# Patient Record
Sex: FEMALE | Race: WHITE | NOT HISPANIC OR LATINO | Employment: PART TIME | RURAL
[De-identification: names, ages, dates, MRNs, and addresses within clinical notes are randomized per-mention and may not be internally consistent; named-entity substitution may affect disease eponyms.]

---

## 2017-05-23 ENCOUNTER — OFFICE VISIT (OUTPATIENT)
Dept: RETAIL CLINIC | Facility: CLINIC | Age: 52
End: 2017-05-23

## 2017-05-23 VITALS
WEIGHT: 184.6 LBS | BODY MASS INDEX: 29.67 KG/M2 | HEART RATE: 104 BPM | HEIGHT: 66 IN | OXYGEN SATURATION: 98 % | TEMPERATURE: 97.9 F | RESPIRATION RATE: 16 BRPM

## 2017-05-23 DIAGNOSIS — J01.00 ACUTE NON-RECURRENT MAXILLARY SINUSITIS: Primary | ICD-10-CM

## 2017-05-23 PROCEDURE — 99213 OFFICE O/P EST LOW 20 MIN: CPT | Performed by: NURSE PRACTITIONER

## 2017-05-23 RX ORDER — AZITHROMYCIN 250 MG/1
TABLET, FILM COATED ORAL
Qty: 6 TABLET | Refills: 0 | Status: SHIPPED | OUTPATIENT
Start: 2017-05-23 | End: 2017-10-01

## 2017-10-01 ENCOUNTER — OFFICE VISIT (OUTPATIENT)
Dept: RETAIL CLINIC | Facility: CLINIC | Age: 52
End: 2017-10-01

## 2017-10-01 VITALS
BODY MASS INDEX: 27.68 KG/M2 | WEIGHT: 172.2 LBS | RESPIRATION RATE: 16 BRPM | HEIGHT: 66 IN | TEMPERATURE: 97.4 F | OXYGEN SATURATION: 95 % | HEART RATE: 71 BPM

## 2017-10-01 DIAGNOSIS — J40 BRONCHITIS: Primary | ICD-10-CM

## 2017-10-01 DIAGNOSIS — J44.1 COPD EXACERBATION (HCC): ICD-10-CM

## 2017-10-01 PROCEDURE — 99213 OFFICE O/P EST LOW 20 MIN: CPT | Performed by: NURSE PRACTITIONER

## 2017-10-01 RX ORDER — IPRATROPIUM BROMIDE AND ALBUTEROL SULFATE 2.5; .5 MG/3ML; MG/3ML
3 SOLUTION RESPIRATORY (INHALATION) ONCE
Status: COMPLETED | OUTPATIENT
Start: 2017-10-01 | End: 2017-10-01

## 2017-10-01 RX ORDER — METHYLPREDNISOLONE ACETATE 80 MG/ML
80 INJECTION, SUSPENSION INTRA-ARTICULAR; INTRALESIONAL; INTRAMUSCULAR; SOFT TISSUE ONCE
Status: COMPLETED | OUTPATIENT
Start: 2017-10-01 | End: 2017-10-01

## 2017-10-01 RX ORDER — AZITHROMYCIN 250 MG/1
TABLET, FILM COATED ORAL
Qty: 6 TABLET | Refills: 0 | Status: SHIPPED | OUTPATIENT
Start: 2017-10-01 | End: 2019-04-14

## 2017-10-01 RX ORDER — BENZONATATE 100 MG/1
100 CAPSULE ORAL 3 TIMES DAILY PRN
Qty: 9 CAPSULE | Refills: 0 | Status: SHIPPED | OUTPATIENT
Start: 2017-10-01 | End: 2017-10-04

## 2017-10-01 RX ORDER — IPRATROPIUM BROMIDE AND ALBUTEROL SULFATE 2.5; .5 MG/3ML; MG/3ML
3 SOLUTION RESPIRATORY (INHALATION)
Status: SHIPPED | OUTPATIENT
Start: 2017-10-01

## 2017-10-01 RX ADMIN — METHYLPREDNISOLONE ACETATE 80 MG: 80 INJECTION, SUSPENSION INTRA-ARTICULAR; INTRALESIONAL; INTRAMUSCULAR; SOFT TISSUE at 16:21

## 2017-10-01 RX ADMIN — IPRATROPIUM BROMIDE AND ALBUTEROL SULFATE 3 ML: 2.5; .5 SOLUTION RESPIRATORY (INHALATION) at 15:55

## 2017-10-01 NOTE — PATIENT INSTRUCTIONS
Chronic Bronchitis  Chronic bronchitis is a lasting inflammation of the bronchial tubes, which are the tubes that carry air into your lungs. This is inflammation that occurs:   · On most days of the week.    · For at least three months at a time.    · Over a period of two years in a row.  When the bronchial tubes are inflamed, they start to produce mucus. The inflammation and buildup of mucus make it more difficult to breathe. Chronic bronchitis is usually a permanent problem and is one type of chronic obstructive pulmonary disease (COPD). People with chronic bronchitis are at greater risk for getting repeated colds, or respiratory infections.  CAUSES   Chronic bronchitis most often occurs in people who have:  · Long-standing, severe asthma.  · A history of smoking.  · Asthma and who also smoke.  SIGNS AND SYMPTOMS   Chronic bronchitis may cause the following:   · A cough that brings up mucus (productive cough).  · Shortness of breath.  · Early morning headache.  · Wheezing.  · Chest discomfort.    · Recurring respiratory infections.  DIAGNOSIS   Your health care provider may confirm the diagnosis by:  · Taking your medical history.  · Performing a physical exam.  · Taking a chest X-ray.    · Performing pulmonary function tests.  TREATMENT   Treatment involves controlling symptoms with medicines, oxygen therapy, or making lifestyle changes, such as exercising and eating a healthy, well-balanced diet. Medicines could include:  · Inhalers to improve air flow in and out of your lungs.  · Antibiotics to treat bacterial infections, such as pneumonia, sinus infections, and acute bronchitis.  As a preventative measure, your health care provider may recommend routine vaccinations for influenza and pneumonia. This is to prevent infection and hospitalization since you may be more at risk for these types of infections.    HOME CARE INSTRUCTIONS  · Take medicines only as directed by your health care provider.    · If you smoke  "cigarettes, chew tobacco, or use electronic cigarettes, quit. If you need help quitting, ask your health care provider.  · Avoid pollen, dust, animal dander, molds, smoke, and other things that cause shortness of breath or wheezing attacks.  · Talk to your health care provider about possible exercise routines. Regular exercise is very important to help you feel better.  · If you are prescribed oxygen use at home follow these guidelines:    Never smoke while using oxygen. Oxygen does not burn or explode, but flammable materials will burn faster in the presence of oxygen.    Keep a fire extinguisher close by. Let your fire department know that you have oxygen in your home.    Warn visitors not to smoke near you when you are using oxygen. Put up \"no smoking\" signs in your home where you most often use the oxygen.    Regularly test your smoke detectors at home to make sure they work. If you receive care in your home from a nurse or other health care provider, he or she may also check to make sure your smoke detectors work.  · Ask your health care provider whether you would benefit from a pulmonary rehabilitation program.  · Do not wait to get medical care if you have any concerning symptoms. Delays could cause permanent injury and may be life threatening.  SEEK MEDICAL CARE IF:  · You have increased coughing or shortness of breath or both.  · You have muscle aches.  · You have chest pain.  · Your mucus gets thicker.  · Your mucus changes from clear or white to yellow, green, gray, or bloody.  SEEK IMMEDIATE MEDICAL CARE IF:  · Your usual medicines do not stop your wheezing.    · You have increased difficulty breathing.    · You have any problems with the medicine you are taking, such as a rash, itching, swelling, or trouble breathing.  MAKE SURE YOU:   · Understand these instructions.  · Will watch your condition.  · Will get help right away if you are not doing well or get worse.     This information is not intended to " replace advice given to you by your health care provider. Make sure you discuss any questions you have with your health care provider.     Document Released: 10/05/2007 Document Revised: 01/08/2016 Document Reviewed: 01/26/2015  [employer] Interactive Patient Education ©2017 [employer]

## 2017-10-01 NOTE — PROGRESS NOTES
"Joe Batres is a 52 y.o. female.   Pulse 71  Temp 97.4 °F (36.3 °C) (Temporal Artery )   Resp 16  Ht 66\" (167.6 cm)  Wt 172 lb 3.2 oz (78.1 kg)  LMP  (LMP Unknown)  SpO2 95%  BMI 27.79 kg/m2   Uses O2 at night; COPD;   Does not monitor O2 at home so does not now how high she typically runs.     O2 went to 96% with less wheeze post neb treatment.   URI    Associated symptoms include congestion, coughing, ear pain, headaches, rhinorrhea and a sore throat. Pertinent negatives include no diarrhea, nausea, vomiting or wheezing.   Cough   Associated symptoms include ear pain, a fever (subjective), headaches, postnasal drip, rhinorrhea, a sore throat and shortness of breath. Pertinent negatives include no wheezing.   Sore Throat    Associated symptoms include congestion, coughing, ear pain, headaches and shortness of breath. Pertinent negatives include no diarrhea or vomiting.   Headache    Associated symptoms include coughing, ear pain, a fever (subjective), rhinorrhea, sinus pressure and a sore throat. Pertinent negatives include no nausea or vomiting.   COPD   This is a recurrent problem. The current episode started in the past 7 days. The problem occurs constantly. The problem has been gradually worsening. Associated symptoms include congestion, coughing, fatigue, a fever (subjective), headaches and a sore throat. Pertinent negatives include no nausea or vomiting.      Pt presents to clinic with COPD exacerbation starting a few days ago and getting worse yesterday. Last night she was having trouble breathing and had to sit in a certain position to keep her breath.   She currently is not on daily medication for this and take albuterol prn. (does not need refill). She had been getting URI around q 3 month, with multiple bouts of pneumonia, last may ending up the hospital. Pt thinks she had pneumonia shot 3 years ago, but unsure.      The following portions of the patient's history were reviewed " and updated as appropriate: allergies, current medications, past family history, past medical history, past social history, past surgical history and problem list.    Review of Systems   Constitutional: Positive for fatigue and fever (subjective).   HENT: Positive for congestion, ear pain, postnasal drip, rhinorrhea, sinus pressure and sore throat.    Respiratory: Positive for cough and shortness of breath. Negative for wheezing.    Cardiovascular: Negative for leg swelling.   Gastrointestinal: Negative for diarrhea, nausea and vomiting.   Neurological: Positive for headaches.       Objective   Physical Exam   Constitutional: She appears well-developed and well-nourished.  Non-toxic appearance. She has a sickly appearance.   HENT:   Head: Normocephalic and atraumatic.   Right Ear: Tympanic membrane and ear canal normal.   Left Ear: Tympanic membrane and ear canal normal.   Nose: Mucosal edema and rhinorrhea present.   Cardiovascular: Regular rhythm and normal heart sounds.    Pulmonary/Chest: Effort normal. She has no wheezes. She has no rhonchi. She has no rales.   Lymphadenopathy:     She has no cervical adenopathy.   Skin: Skin is warm and dry.       Assessment/Plan   Nakita was seen today for uri, cough, sore throat and headache.    Diagnoses and all orders for this visit:    Bronchitis  -     ipratropium-albuterol (DUO-NEB) nebulizer solution 3 mL; Take 3 mL by nebulization 4 (Four) Times a Day.  -     ipratropium-albuterol (DUO-NEB) nebulizer solution 3 mL; Take 3 mL by nebulization 1 (One) Time.    COPD exacerbation  -     ipratropium-albuterol (DUO-NEB) nebulizer solution 3 mL; Take 3 mL by nebulization 1 (One) Time.    Other orders  -     azithromycin (ZITHROMAX Z-KARTHIK) 250 MG tablet; Take 2 tablets the first day, then 1 tablet daily for 4 days.  -     benzonatate (TESSALON) 100 MG capsule; Take 1 capsule by mouth 3 (Three) Times a Day As Needed for Cough for up to 3 days.        Discuss pneumonia vaccine  with PCP and get if has not already.     Continued albuterol every 4 hours 2 puff for the next 3 days; then switch to as needed.

## 2019-01-15 ENCOUNTER — HOSPITAL ENCOUNTER (OUTPATIENT)
Dept: HOSPITAL 22 - PT.CARL | Age: 54
LOS: 22 days | Discharge: HOME | End: 2019-02-06
Payer: MEDICAID

## 2019-01-15 DIAGNOSIS — M25.551: ICD-10-CM

## 2019-01-15 DIAGNOSIS — M25.552: Primary | ICD-10-CM

## 2019-01-15 PROCEDURE — 97014 ELECTRIC STIMULATION THERAPY: CPT

## 2019-01-15 PROCEDURE — 97163 PT EVAL HIGH COMPLEX 45 MIN: CPT

## 2019-01-15 PROCEDURE — 97110 THERAPEUTIC EXERCISES: CPT

## 2019-01-15 PROCEDURE — G0283 ELEC STIM OTHER THAN WOUND: HCPCS

## 2019-04-14 ENCOUNTER — OFFICE VISIT (OUTPATIENT)
Dept: RETAIL CLINIC | Facility: CLINIC | Age: 54
End: 2019-04-14

## 2019-04-14 VITALS
HEIGHT: 66 IN | OXYGEN SATURATION: 96 % | DIASTOLIC BLOOD PRESSURE: 88 MMHG | TEMPERATURE: 98.8 F | WEIGHT: 146.4 LBS | SYSTOLIC BLOOD PRESSURE: 132 MMHG | HEART RATE: 76 BPM | RESPIRATION RATE: 16 BRPM | BODY MASS INDEX: 23.53 KG/M2

## 2019-04-14 DIAGNOSIS — F17.200 SMOKER: ICD-10-CM

## 2019-04-14 DIAGNOSIS — R68.89 FLU-LIKE SYMPTOMS: Primary | ICD-10-CM

## 2019-04-14 DIAGNOSIS — J06.9 UPPER RESPIRATORY TRACT INFECTION, UNSPECIFIED TYPE: ICD-10-CM

## 2019-04-14 LAB
EXPIRATION DATE: NORMAL
FLUAV AG NPH QL: NEGATIVE
FLUBV AG NPH QL: NEGATIVE
INTERNAL CONTROL: NORMAL
Lab: NORMAL

## 2019-04-14 PROCEDURE — 99213 OFFICE O/P EST LOW 20 MIN: CPT | Performed by: NURSE PRACTITIONER

## 2019-04-14 PROCEDURE — 87804 INFLUENZA ASSAY W/OPTIC: CPT | Performed by: NURSE PRACTITIONER

## 2019-04-14 RX ORDER — AZITHROMYCIN 250 MG/1
TABLET, FILM COATED ORAL
Qty: 6 TABLET | Refills: 0 | Status: SHIPPED | OUTPATIENT
Start: 2019-04-14 | End: 2019-09-28

## 2019-04-14 NOTE — PROGRESS NOTES
"Joe Batres is a 53 y.o. female.   /88   Pulse 76   Temp 98.8 °F (37.1 °C)   Resp 16   Ht 167.6 cm (66\")   Wt 66.4 kg (146 lb 6.4 oz)   LMP  (LMP Unknown)   SpO2 96%   BMI 23.63 kg/m²   Past Medical History:   Diagnosis Date   • Acid reflux    • Allergic    • Anxiety    • Arthritis    • COPD (chronic obstructive pulmonary disease) (CMS/[facility])     oxygen at night   • Elevated cholesterol    • Oxygen dependent     at night     No Known Allergies      URI    This is a new problem. The current episode started in the past 7 days. The problem has been gradually worsening. The maximum temperature recorded prior to her arrival was 100.4 - 100.9 F. Associated symptoms include congestion, coughing, a plugged ear sensation and rhinorrhea. Pertinent negatives include no chest pain, diarrhea, dysuria, ear pain, headaches, joint pain, joint swelling, nausea, neck pain, rash, sinus pain, sneezing, sore throat, swollen glands, vomiting or wheezing.        The following portions of the patient's history were reviewed and updated as appropriate: allergies, current medications, past family history, past medical history, past social history, past surgical history and problem list.    Review of Systems   HENT: Positive for congestion and rhinorrhea. Negative for ear pain, sinus pain, sneezing and sore throat.    Respiratory: Positive for cough. Negative for wheezing.    Cardiovascular: Negative for chest pain.   Gastrointestinal: Negative for diarrhea, nausea and vomiting.   Genitourinary: Negative for dysuria.   Musculoskeletal: Negative for joint pain and neck pain.   Skin: Negative for rash.   Neurological: Negative for headaches.       Objective   Physical Exam   Constitutional: She appears well-developed and well-nourished.  Non-toxic appearance. She appears ill.   HENT:   Head: Normocephalic and atraumatic.   Right Ear: Tympanic membrane and ear canal normal.   Left Ear: Tympanic membrane and ear canal " normal.   Nose: Mucosal edema and rhinorrhea present. Right sinus exhibits no maxillary sinus tenderness and no frontal sinus tenderness. Left sinus exhibits no maxillary sinus tenderness and no frontal sinus tenderness.   Mouth/Throat: Uvula is midline and mucous membranes are normal.   Cardiovascular: Regular rhythm and normal heart sounds.   Pulmonary/Chest: Effort normal. She has no wheezes. She has rhonchi. She has no rales.   Lymphadenopathy:     She has no cervical adenopathy.   Skin: Skin is warm and dry.       Assessment/Plan   Nakita was seen today for headache, cough and uri.    Diagnoses and all orders for this visit:    Flu-like symptoms  -     POC Influenza A / B    Upper respiratory tract infection, unspecified type    Smoker    Other orders  -     azithromycin (ZITHROMAX Z-KARTHIK) 250 MG tablet; Take 2 tablets the first day, then 1 tablet daily for 4 days.        Results for orders placed or performed in visit on 04/14/19   POC Influenza A / B   Result Value Ref Range    Rapid Influenza A Ag Negative Negative    Rapid Influenza B Ag Negative Negative    Internal Control Passed Passed    Lot Number [id number]     Expiration Date 4,240,698

## 2019-04-14 NOTE — PATIENT INSTRUCTIONS

## 2019-09-28 ENCOUNTER — OFFICE VISIT (OUTPATIENT)
Dept: RETAIL CLINIC | Facility: CLINIC | Age: 54
End: 2019-09-28

## 2019-09-28 VITALS — DIASTOLIC BLOOD PRESSURE: 78 MMHG | SYSTOLIC BLOOD PRESSURE: 130 MMHG

## 2019-09-28 DIAGNOSIS — H65.03 BILATERAL ACUTE SEROUS OTITIS MEDIA, RECURRENCE NOT SPECIFIED: ICD-10-CM

## 2019-09-28 DIAGNOSIS — F17.200 SMOKER: ICD-10-CM

## 2019-09-28 DIAGNOSIS — J06.9 UPPER RESPIRATORY TRACT INFECTION, UNSPECIFIED TYPE: Primary | ICD-10-CM

## 2019-09-28 PROCEDURE — 99213 OFFICE O/P EST LOW 20 MIN: CPT | Performed by: NURSE PRACTITIONER

## 2019-09-28 RX ORDER — PREDNISONE 10 MG/1
TABLET ORAL
Qty: 21 TABLET | Refills: 0 | Status: SHIPPED | OUTPATIENT
Start: 2019-09-28 | End: 2020-01-26

## 2019-09-28 RX ORDER — DEXTROMETHORPHAN HYDROBROMIDE AND PROMETHAZINE HYDROCHLORIDE 15; 6.25 MG/5ML; MG/5ML
5 SYRUP ORAL NIGHTLY PRN
Qty: 120 ML | Refills: 0 | Status: SHIPPED | OUTPATIENT
Start: 2019-09-28 | End: 2019-10-03

## 2019-09-28 RX ORDER — AZITHROMYCIN 250 MG/1
TABLET, FILM COATED ORAL
Qty: 6 TABLET | Refills: 0 | Status: SHIPPED | OUTPATIENT
Start: 2019-09-28 | End: 2020-01-26

## 2019-09-28 RX ORDER — ALBUTEROL SULFATE 2.5 MG/3ML
2.5 SOLUTION RESPIRATORY (INHALATION) EVERY 4 HOURS PRN
COMMUNITY

## 2019-09-28 NOTE — PROGRESS NOTES
Subjective   Nakita Batres is a 54 y.o. female.   /78   LMP  (LMP Unknown)   No Known Allergies  Past Medical History:   Diagnosis Date   • Acid reflux    • Allergic    • Anxiety    • Arthritis    • COPD (chronic obstructive pulmonary disease) (CMS/[facility])     oxygen at night   • Elevated cholesterol    • Oxygen dependent     at night   • Smoker          URI    This is a new problem. The current episode started in the past 7 days. The problem has been gradually worsening. Maximum temperature: subjective, low grade. Associated symptoms include congestion, coughing, rhinorrhea and sinus pain. She has tried antihistamine and acetaminophen (musinex) for the symptoms. The treatment provided no relief.        The following portions of the patient's history were reviewed and updated as appropriate: allergies, current medications, past family history, past medical history, past social history, past surgical history and problem list.    Review of Systems   Constitutional: Positive for activity change, chills, fatigue and fever. Negative for appetite change.   HENT: Positive for congestion, postnasal drip, rhinorrhea and sinus pain.    Eyes: Negative.    Respiratory: Positive for cough.    Cardiovascular: Negative.    Gastrointestinal: Negative.        Objective   Physical Exam   Constitutional: She appears well-developed and well-nourished.  Non-toxic appearance. She appears ill (mild).   HENT:   Head: Normocephalic and atraumatic.   Right Ear: Tympanic membrane and ear canal normal.   Left Ear: Tympanic membrane and ear canal normal.   Nose: Mucosal edema and rhinorrhea present.   Mouth/Throat: Uvula is midline, oropharynx is clear and moist and mucous membranes are normal.   Cardiovascular: Regular rhythm and normal heart sounds.   Pulmonary/Chest: Effort normal. She has no wheezes. She has rhonchi. She has no rales.   Lymphadenopathy:     She has no cervical adenopathy.   Skin: Skin is warm and dry.        Assessment/Plan   Diagnoses and all orders for this visit:    Upper respiratory tract infection, unspecified type    Bilateral acute serous otitis media, recurrence not specified    Smoker    Other orders  -     azithromycin (ZITHROMAX Z-KARTHIK) 250 MG tablet; Take 2 tablets the first day, then 1 tablet daily for 4 days.  -     predniSONE (DELTASONE) 10 MG tablet; 6/5/4/3/2/1 As directed PO  -     promethazine-dextromethorphan (PROMETHAZINE-DM) 6.25-15 MG/5ML syrup; Take 5 mL by mouth At Night As Needed for Cough for up to 5 days.

## 2019-09-28 NOTE — PATIENT INSTRUCTIONS

## 2020-01-26 ENCOUNTER — OFFICE VISIT (OUTPATIENT)
Dept: RETAIL CLINIC | Facility: CLINIC | Age: 55
End: 2020-01-26

## 2020-01-26 VITALS
BODY MASS INDEX: 26.03 KG/M2 | WEIGHT: 162 LBS | SYSTOLIC BLOOD PRESSURE: 116 MMHG | HEART RATE: 87 BPM | OXYGEN SATURATION: 97 % | TEMPERATURE: 98.2 F | DIASTOLIC BLOOD PRESSURE: 72 MMHG | RESPIRATION RATE: 20 BRPM | HEIGHT: 66 IN

## 2020-01-26 DIAGNOSIS — R05.9 COUGH: ICD-10-CM

## 2020-01-26 DIAGNOSIS — J01.41 ACUTE RECURRENT PANSINUSITIS: Primary | ICD-10-CM

## 2020-01-26 PROCEDURE — 99213 OFFICE O/P EST LOW 20 MIN: CPT | Performed by: NURSE PRACTITIONER

## 2020-01-26 RX ORDER — CITALOPRAM 20 MG/1
20 TABLET ORAL DAILY
COMMUNITY
Start: 2019-12-31

## 2020-01-26 RX ORDER — DOXYCYCLINE 100 MG/1
100 CAPSULE ORAL 2 TIMES DAILY
Qty: 20 CAPSULE | Refills: 0 | Status: SHIPPED | OUTPATIENT
Start: 2020-01-26 | End: 2020-02-05

## 2020-01-26 RX ORDER — BUSPIRONE HYDROCHLORIDE 5 MG/1
TABLET ORAL
COMMUNITY
Start: 2020-01-02

## 2020-01-26 RX ORDER — PSEUDOEPHEDRINE HCL 120 MG/1
120 TABLET, FILM COATED, EXTENDED RELEASE ORAL EVERY 12 HOURS
Qty: 20 TABLET | Refills: 0 | Status: SHIPPED | OUTPATIENT
Start: 2020-01-26 | End: 2020-02-05

## 2020-01-26 RX ORDER — DEXTROMETHORPHAN HYDROBROMIDE AND PROMETHAZINE HYDROCHLORIDE 15; 6.25 MG/5ML; MG/5ML
5 SYRUP ORAL 4 TIMES DAILY PRN
Qty: 240 ML | Refills: 0 | Status: SHIPPED | OUTPATIENT
Start: 2020-01-26 | End: 2020-02-05

## 2020-01-26 RX ORDER — PREDNISONE 10 MG/1
TABLET ORAL DAILY
Qty: 21 EACH | Refills: 0 | Status: SHIPPED | OUTPATIENT
Start: 2020-01-26 | End: 2020-02-01

## 2020-01-27 NOTE — PROGRESS NOTES
Subjective   Nakita Batres is a 54 y.o. female.     Sinus Problem   This is a recurrent problem. Episode onset: 2-3 weeks. The problem has been gradually worsening since onset. There has been no fever. The pain is severe. Associated symptoms include congestion, coughing, headaches, a hoarse voice, sinus pressure and swollen glands. Pertinent negatives include no chills, ear pain, neck pain, shortness of breath, sneezing or sore throat. Treatments tried: allergy medications. The treatment provided no relief.   Cough   This is a new problem. The current episode started in the past 7 days. The problem has been unchanged. The problem occurs every few minutes. The cough is non-productive. Associated symptoms include ear congestion, headaches, nasal congestion, postnasal drip and rhinorrhea (thick yellowish). Pertinent negatives include no chest pain, chills, ear pain, fever, myalgias, rash, sore throat, shortness of breath, sweats, weight loss or wheezing. The symptoms are aggravated by cold air. She has tried OTC cough suppressant for the symptoms. Her past medical history is significant for bronchitis. There is no history of asthma or pneumonia.        The following portions of the patient's history were reviewed and updated as appropriate: allergies, current medications, past medical history, past social history, past surgical history and problem list.    Review of Systems   Constitutional: Negative for appetite change, chills, fever and weight loss.   HENT: Positive for congestion, hoarse voice, postnasal drip, rhinorrhea (thick yellowish), sinus pressure and sinus pain. Negative for ear pain, sneezing, sore throat and trouble swallowing.    Eyes: Negative.    Respiratory: Positive for cough and chest tightness. Negative for shortness of breath and wheezing.    Cardiovascular: Negative.  Negative for chest pain.   Gastrointestinal: Negative for abdominal pain, diarrhea, nausea and vomiting.   Musculoskeletal:  "Negative for myalgias and neck pain.   Skin: Negative.  Negative for rash.   Neurological: Positive for headaches. Negative for dizziness.   Hematological: Positive for adenopathy.        /72   Pulse 87   Temp 98.2 °F (36.8 °C)   Resp 20   Ht 167.6 cm (66\")   Wt 73.5 kg (162 lb)   LMP  (LMP Unknown)   SpO2 97%   BMI 26.15 kg/m²      Objective   Physical Exam   Constitutional: She is oriented to person, place, and time. Vital signs are normal. She appears well-developed and well-nourished. No distress.   HENT:   Head: Normocephalic.   Right Ear: External ear and ear canal normal. No drainage, swelling or tenderness. Tympanic membrane is bulging. Tympanic membrane is not erythematous.   Left Ear: External ear and ear canal normal. No drainage, swelling or tenderness. Tympanic membrane is bulging. Tympanic membrane is not erythematous.   Nose: Mucosal edema and rhinorrhea (thick yellowish) present. Right sinus exhibits maxillary sinus tenderness and frontal sinus tenderness (severe). Left sinus exhibits maxillary sinus tenderness and frontal sinus tenderness (severe).   Mouth/Throat: Uvula is midline, oropharynx is clear and moist and mucous membranes are normal. Tonsils are 0 on the right. Tonsils are 0 on the left. No tonsillar exudate.   Eyes: Pupils are equal, round, and reactive to light. Conjunctivae are normal.   Neck: Normal range of motion. Neck supple.   Cardiovascular: Normal rate, regular rhythm, S1 normal, S2 normal and normal heart sounds.   Pulmonary/Chest: Effort normal and breath sounds normal. No stridor. No respiratory distress. She has no decreased breath sounds. She has no wheezes. She has no rhonchi. She has no rales.   Abdominal: Soft. Normal appearance and bowel sounds are normal. She exhibits no distension. There is no tenderness. There is no rebound and no guarding.   Lymphadenopathy:        Head (right side): Tonsillar (mild) adenopathy present.        Head (left side): " Tonsillar (mild) adenopathy present.     She has no cervical adenopathy.   Neurological: She is alert and oriented to person, place, and time.   Skin: Skin is warm, dry and intact. No rash noted. She is not diaphoretic.   Psychiatric: She has a normal mood and affect. Her speech is normal and behavior is normal. Thought content normal.   Vitals reviewed.      Assessment/Plan   Nakita was seen today for sinus problem.    Diagnoses and all orders for this visit:    Acute recurrent pansinusitis  -     doxycycline (MONODOX) 100 MG capsule; Take 1 capsule by mouth 2 (Two) Times a Day for 10 days.  -     pseudoephedrine (SUDAFED) 120 MG 12 hr tablet; Take 1 tablet by mouth Every 12 (Twelve) Hours for 10 days.  -     predniSONE (DELTASONE) 10 MG (21) tablet pack; Take  by mouth Daily for 6 days. Use as directed on package    Cough  -     predniSONE (DELTASONE) 10 MG (21) tablet pack; Take  by mouth Daily for 6 days. Use as directed on package  -     promethazine-dextromethorphan (PROMETHAZINE-DM) 6.25-15 MG/5ML syrup; Take 5 mL by mouth 4 (Four) Times a Day As Needed for Cough for up to 10 days.

## 2020-03-22 RX ORDER — PREDNISONE 10 MG/1
TABLET ORAL
Qty: 21 TABLET | Refills: 0 | OUTPATIENT
Start: 2020-03-22

## 2021-08-13 ENCOUNTER — HOSPITAL ENCOUNTER (OUTPATIENT)
Age: 56
End: 2021-08-13
Payer: MEDICAID

## 2021-08-13 DIAGNOSIS — R53.83: Primary | ICD-10-CM

## 2021-08-13 DIAGNOSIS — Z79.899: ICD-10-CM

## 2021-08-13 DIAGNOSIS — E55.9: ICD-10-CM

## 2021-08-13 LAB
25-OH VITAMIN D, TOTAL: 17.8 NG/ML (ref 30–100)
ALBUMIN LEVEL: 3.7 G/DL (ref 3.5–5)
ALBUMIN/GLOB SERPL: 1.4 {RATIO} (ref 1.1–1.8)
ALP ISO SERPL-ACNC: 102 U/L (ref 38–126)
ALT SERPLBLD-CCNC: 24 U/L (ref 12–78)
ANION GAP SERPL CALC-SCNC: 13.7 MEQ/L (ref 5–15)
AST SERPL QL: 24 U/L (ref 14–36)
BILIRUBIN,TOTAL: 0.4 MG/DL (ref 0.2–1.3)
BUN SERPL-MCNC: 8 MG/DL (ref 7–17)
CALCIUM SPEC-MCNC: 9.3 MG/DL (ref 8.4–10.2)
CHLORIDE SPEC-SCNC: 107 MMOL/L (ref 98–107)
CHOLEST SPEC-SCNC: 180 MG/DL (ref 140–200)
CO2 SERPL-SCNC: 25 MMOL/L (ref 22–30)
CREAT BLD-SCNC: 0.5 MG/DL (ref 0.52–1.04)
ESTIMATED GLOMERULAR FILT RATE: 128 ML/MIN (ref 60–?)
GFR (AFRICAN AMERICAN): 155 ML/MIN (ref 60–?)
GLOBULIN SER CALC-MCNC: 2.7 G/DL (ref 1.3–3.2)
GLUCOSE: 99 MG/DL (ref 74–100)
HCT VFR BLD CALC: 41.2 % (ref 37–47)
HDLC SERPL-MCNC: 50 MG/DL (ref 40–60)
HGB BLD-MCNC: 13.5 G/DL (ref 12.2–16.2)
MCHC RBC-ENTMCNC: 32.8 G/DL (ref 31.8–35.4)
MCV RBC: 92.7 FL (ref 81–99)
MEAN CORPUSCULAR HEMOGLOBIN: 30.4 PG (ref 27–31.2)
PLATELET # BLD: 695 K/MM3 (ref 142–424)
POTASSIUM: 4.7 MMOL/L (ref 3.5–5.1)
PROT SERPL-MCNC: 6.4 G/DL (ref 6.3–8.2)
RBC # BLD AUTO: 4.44 M/MM3 (ref 4.2–5.4)
SODIUM SPEC-SCNC: 141 MMOL/L (ref 136–145)
T4 FREE SERPL-MCNC: 1.34 NG/DL (ref 0.78–2.19)
TRIGLYCERIDES: 169 MG/DL (ref 30–150)
TSH SERPL-ACNC: 0.72 UIU/ML (ref 0.47–4.68)
WBC # BLD AUTO: 8.5 K/MM3 (ref 4.8–10.8)

## 2021-08-13 PROCEDURE — 82306 VITAMIN D 25 HYDROXY: CPT

## 2021-08-13 PROCEDURE — 80061 LIPID PANEL: CPT

## 2021-08-13 PROCEDURE — 85025 COMPLETE CBC W/AUTO DIFF WBC: CPT

## 2021-08-13 PROCEDURE — 84439 ASSAY OF FREE THYROXINE: CPT

## 2021-08-13 PROCEDURE — 80053 COMPREHEN METABOLIC PANEL: CPT

## 2021-08-13 PROCEDURE — 84443 ASSAY THYROID STIM HORMONE: CPT

## 2021-09-05 ENCOUNTER — HOSPITAL ENCOUNTER (EMERGENCY)
Age: 56
Discharge: HOME | End: 2021-09-05
Payer: MEDICAID

## 2021-09-05 VITALS — BODY MASS INDEX: 27.9 KG/M2

## 2021-09-05 VITALS
TEMPERATURE: 98.6 F | SYSTOLIC BLOOD PRESSURE: 120 MMHG | HEART RATE: 90 BPM | RESPIRATION RATE: 20 BRPM | DIASTOLIC BLOOD PRESSURE: 63 MMHG | OXYGEN SATURATION: 98 %

## 2021-09-05 VITALS
HEART RATE: 78 BPM | RESPIRATION RATE: 18 BRPM | SYSTOLIC BLOOD PRESSURE: 123 MMHG | TEMPERATURE: 98 F | OXYGEN SATURATION: 98 % | DIASTOLIC BLOOD PRESSURE: 65 MMHG

## 2021-09-05 DIAGNOSIS — I25.10: ICD-10-CM

## 2021-09-05 DIAGNOSIS — E78.5: ICD-10-CM

## 2021-09-05 DIAGNOSIS — J44.0: ICD-10-CM

## 2021-09-05 DIAGNOSIS — Z79.899: ICD-10-CM

## 2021-09-05 DIAGNOSIS — J44.1: Primary | ICD-10-CM

## 2021-09-05 DIAGNOSIS — F17.210: ICD-10-CM

## 2021-09-05 LAB
ALBUMIN LEVEL: 3.6 G/DL (ref 3.5–5)
ALBUMIN/GLOB SERPL: 1.2 {RATIO} (ref 1.1–1.8)
ALP ISO SERPL-ACNC: 107 U/L (ref 38–126)
ALT SERPLBLD-CCNC: 24 U/L (ref 12–78)
ANION GAP SERPL CALC-SCNC: 8.7 MEQ/L (ref 5–15)
AST SERPL QL: 27 U/L (ref 14–36)
BILIRUBIN,TOTAL: 0.2 MG/DL (ref 0.2–1.3)
BUN SERPL-MCNC: 7 MG/DL (ref 7–17)
CALCIUM SPEC-MCNC: 9 MG/DL (ref 8.4–10.2)
CHLORIDE SPEC-SCNC: 109 MMOL/L (ref 98–107)
CO2 SERPL-SCNC: 26 MMOL/L (ref 22–30)
CREAT BLD-SCNC: 0.7 MG/DL (ref 0.52–1.04)
CREATININE CLEARANCE ESTIMATED: 112 ML/MIN (ref 50–200)
ESTIMATED GLOMERULAR FILT RATE: 87 ML/MIN (ref 60–?)
GFR (AFRICAN AMERICAN): 105 ML/MIN (ref 60–?)
GLOBULIN SER CALC-MCNC: 3 G/DL (ref 1.3–3.2)
GLUCOSE: 109 MG/DL (ref 74–100)
HCT VFR BLD CALC: 39.9 % (ref 37–47)
HGB BLD-MCNC: 12.8 G/DL (ref 12.2–16.2)
MCHC RBC-ENTMCNC: 32 G/DL (ref 31.8–35.4)
MCV RBC: 97.2 FL (ref 81–99)
MEAN CORPUSCULAR HEMOGLOBIN: 31.1 PG (ref 27–31.2)
PLATELET # BLD: 486 K/MM3 (ref 142–424)
POTASSIUM: 3.7 MMOL/L (ref 3.5–5.1)
PROT SERPL-MCNC: 6.6 G/DL (ref 6.3–8.2)
RBC # BLD AUTO: 4.1 M/MM3 (ref 4.2–5.4)
SODIUM SPEC-SCNC: 140 MMOL/L (ref 136–145)
WBC # BLD AUTO: 9.1 K/MM3 (ref 4.8–10.8)

## 2021-09-05 PROCEDURE — 85025 COMPLETE CBC W/AUTO DIFF WBC: CPT

## 2021-09-05 PROCEDURE — 99283 EMERGENCY DEPT VISIT LOW MDM: CPT

## 2021-09-05 PROCEDURE — U0003 INFECTIOUS AGENT DETECTION BY NUCLEIC ACID (DNA OR RNA); SEVERE ACUTE RESPIRATORY SYNDROME CORONAVIRUS 2 (SARS-COV-2) (CORONAVIRUS DISEASE [COVID-19]), AMPLIFIED PROBE TECHNIQUE, MAKING USE OF HIGH THROUGHPUT TECHNOLOGIES AS DESCRIBED BY CMS-2020-01-R: HCPCS

## 2021-09-05 PROCEDURE — 80053 COMPREHEN METABOLIC PANEL: CPT

## 2021-09-05 PROCEDURE — 96374 THER/PROPH/DIAG INJ IV PUSH: CPT

## 2021-09-05 PROCEDURE — 71045 X-RAY EXAM CHEST 1 VIEW: CPT

## 2021-09-16 ENCOUNTER — HOSPITAL ENCOUNTER (OUTPATIENT)
Age: 56
End: 2021-09-16
Payer: MEDICAID

## 2021-09-16 DIAGNOSIS — R06.02: Primary | ICD-10-CM

## 2021-09-16 LAB — NT PRO BRAIN NATRIURETIC PEP.: 64.3 PG/ML (ref 0–125)

## 2021-09-16 PROCEDURE — 83880 ASSAY OF NATRIURETIC PEPTIDE: CPT

## 2021-09-16 PROCEDURE — 36415 COLL VENOUS BLD VENIPUNCTURE: CPT

## 2021-10-04 ENCOUNTER — HOSPITAL ENCOUNTER (OUTPATIENT)
Age: 56
End: 2021-10-04
Payer: MEDICAID

## 2021-10-04 DIAGNOSIS — J44.9: ICD-10-CM

## 2021-10-04 DIAGNOSIS — E78.5: ICD-10-CM

## 2021-10-04 DIAGNOSIS — Z99.89: ICD-10-CM

## 2021-10-04 DIAGNOSIS — Z72.0: ICD-10-CM

## 2021-10-04 DIAGNOSIS — I65.23: ICD-10-CM

## 2021-10-04 DIAGNOSIS — G47.33: ICD-10-CM

## 2021-10-04 DIAGNOSIS — K21.9: ICD-10-CM

## 2021-10-04 DIAGNOSIS — R06.00: Primary | ICD-10-CM

## 2021-10-04 PROCEDURE — 93306 TTE W/DOPPLER COMPLETE: CPT

## 2021-10-04 PROCEDURE — 71250 CT THORAX DX C-: CPT

## 2021-10-04 PROCEDURE — 93880 EXTRACRANIAL BILAT STUDY: CPT

## 2021-10-18 ENCOUNTER — HOSPITAL ENCOUNTER (OUTPATIENT)
Age: 56
End: 2021-10-18
Payer: MEDICAID

## 2021-10-18 DIAGNOSIS — M25.552: Primary | ICD-10-CM

## 2021-10-18 DIAGNOSIS — M54.9: ICD-10-CM

## 2021-10-18 DIAGNOSIS — M25.551: ICD-10-CM

## 2021-10-18 PROCEDURE — 72110 X-RAY EXAM L-2 SPINE 4/>VWS: CPT

## 2021-10-18 PROCEDURE — 73502 X-RAY EXAM HIP UNI 2-3 VIEWS: CPT

## 2021-10-28 ENCOUNTER — HOSPITAL ENCOUNTER (OUTPATIENT)
Age: 56
End: 2021-10-28
Payer: MEDICAID

## 2021-10-28 VITALS — BODY MASS INDEX: 27.9 KG/M2

## 2021-10-28 VITALS
DIASTOLIC BLOOD PRESSURE: 80 MMHG | OXYGEN SATURATION: 92 % | RESPIRATION RATE: 18 BRPM | SYSTOLIC BLOOD PRESSURE: 118 MMHG | HEART RATE: 88 BPM

## 2021-10-28 DIAGNOSIS — M46.1: Primary | ICD-10-CM

## 2021-10-28 DIAGNOSIS — M70.60: ICD-10-CM

## 2021-10-28 DIAGNOSIS — G90.519: ICD-10-CM

## 2021-10-28 PROCEDURE — G0463 HOSPITAL OUTPT CLINIC VISIT: HCPCS

## 2021-10-28 PROCEDURE — 99202 OFFICE O/P NEW SF 15 MIN: CPT

## 2021-12-03 ENCOUNTER — HOSPITAL ENCOUNTER (OUTPATIENT)
Dept: HOSPITAL 22 - SC.PAINP | Age: 56
Discharge: HOME | End: 2021-12-03
Payer: MEDICAID

## 2021-12-03 VITALS
SYSTOLIC BLOOD PRESSURE: 116 MMHG | OXYGEN SATURATION: 95 % | HEART RATE: 91 BPM | TEMPERATURE: 98.06 F | DIASTOLIC BLOOD PRESSURE: 65 MMHG | RESPIRATION RATE: 18 BRPM

## 2021-12-03 VITALS
DIASTOLIC BLOOD PRESSURE: 76 MMHG | SYSTOLIC BLOOD PRESSURE: 132 MMHG | OXYGEN SATURATION: 96 % | HEART RATE: 87 BPM | RESPIRATION RATE: 20 BRPM

## 2021-12-03 VITALS
OXYGEN SATURATION: 95 % | HEART RATE: 94 BPM | DIASTOLIC BLOOD PRESSURE: 63 MMHG | SYSTOLIC BLOOD PRESSURE: 110 MMHG | RESPIRATION RATE: 18 BRPM

## 2021-12-03 VITALS — BODY MASS INDEX: 27.9 KG/M2

## 2021-12-03 VITALS
RESPIRATION RATE: 18 BRPM | OXYGEN SATURATION: 96 % | SYSTOLIC BLOOD PRESSURE: 115 MMHG | HEART RATE: 94 BPM | DIASTOLIC BLOOD PRESSURE: 68 MMHG

## 2021-12-03 DIAGNOSIS — E78.5: ICD-10-CM

## 2021-12-03 DIAGNOSIS — Z79.899: ICD-10-CM

## 2021-12-03 DIAGNOSIS — I25.10: ICD-10-CM

## 2021-12-03 DIAGNOSIS — J44.9: ICD-10-CM

## 2021-12-03 DIAGNOSIS — M70.62: ICD-10-CM

## 2021-12-03 DIAGNOSIS — M46.1: Primary | ICD-10-CM

## 2021-12-03 DIAGNOSIS — K75.9: ICD-10-CM

## 2021-12-03 DIAGNOSIS — Z79.82: ICD-10-CM

## 2021-12-03 DIAGNOSIS — F32.A: ICD-10-CM

## 2021-12-03 DIAGNOSIS — Z72.0: ICD-10-CM

## 2021-12-03 DIAGNOSIS — F41.9: ICD-10-CM

## 2021-12-03 PROCEDURE — 27096 INJECT SACROILIAC JOINT: CPT

## 2021-12-03 PROCEDURE — 77002 NEEDLE LOCALIZATION BY XRAY: CPT

## 2021-12-03 PROCEDURE — 20610 DRAIN/INJ JOINT/BURSA W/O US: CPT

## 2021-12-03 PROCEDURE — G0260 INJ FOR SACROILIAC JT ANESTH: HCPCS

## 2021-12-21 ENCOUNTER — HOSPITAL ENCOUNTER (OUTPATIENT)
Age: 56
End: 2021-12-21
Payer: MEDICAID

## 2021-12-21 VITALS
HEART RATE: 95 BPM | RESPIRATION RATE: 18 BRPM | DIASTOLIC BLOOD PRESSURE: 87 MMHG | OXYGEN SATURATION: 95 % | SYSTOLIC BLOOD PRESSURE: 155 MMHG

## 2021-12-21 VITALS — BODY MASS INDEX: 27.9 KG/M2

## 2021-12-21 DIAGNOSIS — M46.1: Primary | ICD-10-CM

## 2021-12-21 DIAGNOSIS — M25.512: ICD-10-CM

## 2021-12-21 PROCEDURE — G0463 HOSPITAL OUTPT CLINIC VISIT: HCPCS

## 2021-12-21 PROCEDURE — 99212 OFFICE O/P EST SF 10 MIN: CPT

## 2021-12-28 ENCOUNTER — HOSPITAL ENCOUNTER (OUTPATIENT)
Age: 56
End: 2021-12-28
Payer: MEDICAID

## 2021-12-28 DIAGNOSIS — M25.512: Primary | ICD-10-CM

## 2021-12-28 PROCEDURE — 73221 MRI JOINT UPR EXTREM W/O DYE: CPT

## 2022-01-11 ENCOUNTER — HOSPITAL ENCOUNTER (OUTPATIENT)
Age: 57
End: 2022-01-11
Payer: MEDICAID

## 2022-01-11 VITALS
DIASTOLIC BLOOD PRESSURE: 77 MMHG | RESPIRATION RATE: 18 BRPM | OXYGEN SATURATION: 94 % | HEART RATE: 87 BPM | SYSTOLIC BLOOD PRESSURE: 158 MMHG

## 2022-01-11 VITALS — BODY MASS INDEX: 28.5 KG/M2

## 2022-01-11 DIAGNOSIS — M46.1: Primary | ICD-10-CM

## 2022-01-11 DIAGNOSIS — M25.512: ICD-10-CM

## 2022-01-11 DIAGNOSIS — M70.62: ICD-10-CM

## 2022-01-11 PROCEDURE — G0463 HOSPITAL OUTPT CLINIC VISIT: HCPCS

## 2022-01-11 PROCEDURE — 99212 OFFICE O/P EST SF 10 MIN: CPT

## 2022-01-28 ENCOUNTER — HOSPITAL ENCOUNTER (OUTPATIENT)
Dept: HOSPITAL 22 - SC.PAINP | Age: 57
Discharge: HOME | End: 2022-01-28
Payer: MEDICAID

## 2022-01-28 VITALS
DIASTOLIC BLOOD PRESSURE: 81 MMHG | OXYGEN SATURATION: 96 % | RESPIRATION RATE: 20 BRPM | SYSTOLIC BLOOD PRESSURE: 119 MMHG | HEART RATE: 73 BPM

## 2022-01-28 VITALS
OXYGEN SATURATION: 96 % | DIASTOLIC BLOOD PRESSURE: 85 MMHG | TEMPERATURE: 98.3 F | SYSTOLIC BLOOD PRESSURE: 111 MMHG | RESPIRATION RATE: 18 BRPM | HEART RATE: 74 BPM

## 2022-01-28 VITALS — BODY MASS INDEX: 28.7 KG/M2

## 2022-01-28 VITALS — RESPIRATION RATE: 18 BRPM | OXYGEN SATURATION: 96 % | HEART RATE: 72 BPM

## 2022-01-28 VITALS
RESPIRATION RATE: 18 BRPM | OXYGEN SATURATION: 97 % | SYSTOLIC BLOOD PRESSURE: 109 MMHG | DIASTOLIC BLOOD PRESSURE: 67 MMHG | HEART RATE: 72 BPM

## 2022-01-28 DIAGNOSIS — F32.A: ICD-10-CM

## 2022-01-28 DIAGNOSIS — E78.5: ICD-10-CM

## 2022-01-28 DIAGNOSIS — K21.9: ICD-10-CM

## 2022-01-28 DIAGNOSIS — M70.61: ICD-10-CM

## 2022-01-28 DIAGNOSIS — I25.10: ICD-10-CM

## 2022-01-28 DIAGNOSIS — M46.1: Primary | ICD-10-CM

## 2022-01-28 DIAGNOSIS — J44.9: ICD-10-CM

## 2022-01-28 DIAGNOSIS — Z72.0: ICD-10-CM

## 2022-01-28 PROCEDURE — 77002 NEEDLE LOCALIZATION BY XRAY: CPT

## 2022-01-28 PROCEDURE — 20610 DRAIN/INJ JOINT/BURSA W/O US: CPT

## 2022-01-28 PROCEDURE — 27096 INJECT SACROILIAC JOINT: CPT

## 2022-01-28 PROCEDURE — G0260 INJ FOR SACROILIAC JT ANESTH: HCPCS

## 2022-03-17 ENCOUNTER — HOSPITAL ENCOUNTER (OUTPATIENT)
Age: 57
End: 2022-03-17
Payer: MEDICAID

## 2022-03-17 VITALS — BODY MASS INDEX: 28.7 KG/M2

## 2022-03-17 VITALS
HEART RATE: 79 BPM | DIASTOLIC BLOOD PRESSURE: 72 MMHG | RESPIRATION RATE: 20 BRPM | TEMPERATURE: 98.06 F | SYSTOLIC BLOOD PRESSURE: 138 MMHG | OXYGEN SATURATION: 95 %

## 2022-03-17 DIAGNOSIS — M46.1: Primary | ICD-10-CM

## 2022-03-17 DIAGNOSIS — M70.60: ICD-10-CM

## 2022-03-17 DIAGNOSIS — M25.512: ICD-10-CM

## 2022-03-17 PROCEDURE — 99212 OFFICE O/P EST SF 10 MIN: CPT

## 2022-03-17 PROCEDURE — G0463 HOSPITAL OUTPT CLINIC VISIT: HCPCS

## 2022-03-25 ENCOUNTER — HOSPITAL ENCOUNTER (OUTPATIENT)
Dept: HOSPITAL 22 - SC.PAINP | Age: 57
Discharge: HOME | End: 2022-03-25
Payer: MEDICAID

## 2022-03-25 VITALS
DIASTOLIC BLOOD PRESSURE: 90 MMHG | RESPIRATION RATE: 20 BRPM | OXYGEN SATURATION: 97 % | HEART RATE: 77 BPM | SYSTOLIC BLOOD PRESSURE: 171 MMHG

## 2022-03-25 VITALS
RESPIRATION RATE: 20 BRPM | OXYGEN SATURATION: 96 % | SYSTOLIC BLOOD PRESSURE: 134 MMHG | TEMPERATURE: 98.1 F | HEART RATE: 72 BPM | DIASTOLIC BLOOD PRESSURE: 72 MMHG

## 2022-03-25 VITALS — BODY MASS INDEX: 27.9 KG/M2

## 2022-03-25 DIAGNOSIS — M19.90: ICD-10-CM

## 2022-03-25 DIAGNOSIS — I25.10: ICD-10-CM

## 2022-03-25 DIAGNOSIS — E78.5: ICD-10-CM

## 2022-03-25 DIAGNOSIS — Z72.0: ICD-10-CM

## 2022-03-25 DIAGNOSIS — F32.A: ICD-10-CM

## 2022-03-25 DIAGNOSIS — J44.9: ICD-10-CM

## 2022-03-25 DIAGNOSIS — K21.9: ICD-10-CM

## 2022-03-25 DIAGNOSIS — I10: ICD-10-CM

## 2022-03-25 DIAGNOSIS — M19.012: Primary | ICD-10-CM

## 2022-03-25 PROCEDURE — 20610 DRAIN/INJ JOINT/BURSA W/O US: CPT

## 2022-04-11 ENCOUNTER — HOSPITAL ENCOUNTER (OUTPATIENT)
Age: 57
End: 2022-04-11
Payer: MEDICAID

## 2022-04-11 VITALS
SYSTOLIC BLOOD PRESSURE: 133 MMHG | HEART RATE: 91 BPM | TEMPERATURE: 98.42 F | OXYGEN SATURATION: 95 % | RESPIRATION RATE: 18 BRPM | DIASTOLIC BLOOD PRESSURE: 83 MMHG

## 2022-04-11 VITALS — BODY MASS INDEX: 27.9 KG/M2

## 2022-04-11 DIAGNOSIS — M79.12: ICD-10-CM

## 2022-04-11 DIAGNOSIS — M25.512: ICD-10-CM

## 2022-04-11 DIAGNOSIS — M46.1: Primary | ICD-10-CM

## 2022-04-11 DIAGNOSIS — M70.62: ICD-10-CM

## 2022-04-11 PROCEDURE — G0463 HOSPITAL OUTPT CLINIC VISIT: HCPCS

## 2022-04-11 PROCEDURE — 99212 OFFICE O/P EST SF 10 MIN: CPT

## 2022-04-15 ENCOUNTER — HOSPITAL ENCOUNTER (OUTPATIENT)
Dept: HOSPITAL 22 - SC.PAINP | Age: 57
Discharge: HOME | End: 2022-04-15
Payer: MEDICAID

## 2022-04-15 VITALS — SYSTOLIC BLOOD PRESSURE: 110 MMHG | OXYGEN SATURATION: 96 % | HEART RATE: 77 BPM | DIASTOLIC BLOOD PRESSURE: 65 MMHG

## 2022-04-15 VITALS
RESPIRATION RATE: 20 BRPM | OXYGEN SATURATION: 96 % | HEART RATE: 78 BPM | DIASTOLIC BLOOD PRESSURE: 78 MMHG | SYSTOLIC BLOOD PRESSURE: 124 MMHG

## 2022-04-15 VITALS
SYSTOLIC BLOOD PRESSURE: 110 MMHG | DIASTOLIC BLOOD PRESSURE: 65 MMHG | OXYGEN SATURATION: 95 % | HEART RATE: 75 BPM | RESPIRATION RATE: 20 BRPM

## 2022-04-15 VITALS
TEMPERATURE: 98.42 F | OXYGEN SATURATION: 96 % | SYSTOLIC BLOOD PRESSURE: 134 MMHG | DIASTOLIC BLOOD PRESSURE: 72 MMHG | RESPIRATION RATE: 18 BRPM | HEART RATE: 81 BPM

## 2022-04-15 VITALS — BODY MASS INDEX: 27.9 KG/M2

## 2022-04-15 DIAGNOSIS — M46.1: Primary | ICD-10-CM

## 2022-04-15 DIAGNOSIS — I25.10: ICD-10-CM

## 2022-04-15 DIAGNOSIS — K21.9: ICD-10-CM

## 2022-04-15 DIAGNOSIS — J44.9: ICD-10-CM

## 2022-04-15 DIAGNOSIS — F32.A: ICD-10-CM

## 2022-04-15 DIAGNOSIS — E78.5: ICD-10-CM

## 2022-04-15 DIAGNOSIS — M19.90: ICD-10-CM

## 2022-04-15 DIAGNOSIS — I10: ICD-10-CM

## 2022-04-15 DIAGNOSIS — Z72.0: ICD-10-CM

## 2022-04-15 DIAGNOSIS — M70.62: ICD-10-CM

## 2022-04-15 DIAGNOSIS — F41.9: ICD-10-CM

## 2022-04-15 PROCEDURE — G0260 INJ FOR SACROILIAC JT ANESTH: HCPCS

## 2022-04-15 PROCEDURE — 27096 INJECT SACROILIAC JOINT: CPT

## 2022-04-15 PROCEDURE — 77002 NEEDLE LOCALIZATION BY XRAY: CPT

## 2022-04-15 PROCEDURE — 20610 DRAIN/INJ JOINT/BURSA W/O US: CPT

## 2022-04-28 ENCOUNTER — HOSPITAL ENCOUNTER (OUTPATIENT)
Age: 57
End: 2022-04-28
Payer: MEDICAID

## 2022-04-28 VITALS
OXYGEN SATURATION: 100 % | DIASTOLIC BLOOD PRESSURE: 84 MMHG | SYSTOLIC BLOOD PRESSURE: 115 MMHG | TEMPERATURE: 98.2 F | RESPIRATION RATE: 18 BRPM | HEART RATE: 70 BPM

## 2022-04-28 VITALS — BODY MASS INDEX: 27.9 KG/M2

## 2022-04-28 DIAGNOSIS — M70.62: ICD-10-CM

## 2022-04-28 DIAGNOSIS — M79.18: Primary | ICD-10-CM

## 2022-04-28 DIAGNOSIS — M46.1: ICD-10-CM

## 2022-04-28 PROCEDURE — 99212 OFFICE O/P EST SF 10 MIN: CPT

## 2022-04-28 PROCEDURE — G0463 HOSPITAL OUTPT CLINIC VISIT: HCPCS

## 2022-05-05 ENCOUNTER — HOSPITAL ENCOUNTER (OUTPATIENT)
Dept: HOSPITAL 22 - OT | Age: 57
Discharge: HOME | End: 2022-05-05
Payer: MEDICAID

## 2022-05-05 DIAGNOSIS — M25.512: Primary | ICD-10-CM

## 2022-05-05 PROCEDURE — 97014 ELECTRIC STIMULATION THERAPY: CPT

## 2022-05-05 PROCEDURE — 97010 HOT OR COLD PACKS THERAPY: CPT

## 2022-05-05 PROCEDURE — 97165 OT EVAL LOW COMPLEX 30 MIN: CPT

## 2022-05-05 PROCEDURE — G0283 ELEC STIM OTHER THAN WOUND: HCPCS

## 2022-05-05 PROCEDURE — 97530 THERAPEUTIC ACTIVITIES: CPT

## 2022-05-13 ENCOUNTER — HOSPITAL ENCOUNTER (OUTPATIENT)
Dept: HOSPITAL 22 - SC.PAINP | Age: 57
Discharge: HOME | End: 2022-05-13
Payer: MEDICAID

## 2022-05-13 VITALS
TEMPERATURE: 98.2 F | HEART RATE: 83 BPM | OXYGEN SATURATION: 97 % | DIASTOLIC BLOOD PRESSURE: 92 MMHG | RESPIRATION RATE: 18 BRPM | SYSTOLIC BLOOD PRESSURE: 125 MMHG

## 2022-05-13 VITALS — BODY MASS INDEX: 27.9 KG/M2

## 2022-05-13 VITALS
OXYGEN SATURATION: 98 % | HEART RATE: 80 BPM | RESPIRATION RATE: 20 BRPM | SYSTOLIC BLOOD PRESSURE: 119 MMHG | DIASTOLIC BLOOD PRESSURE: 87 MMHG

## 2022-05-13 VITALS — DIASTOLIC BLOOD PRESSURE: 87 MMHG | HEART RATE: 72 BPM | RESPIRATION RATE: 20 BRPM | SYSTOLIC BLOOD PRESSURE: 136 MMHG

## 2022-05-13 DIAGNOSIS — F32.A: ICD-10-CM

## 2022-05-13 DIAGNOSIS — I10: ICD-10-CM

## 2022-05-13 DIAGNOSIS — E78.5: ICD-10-CM

## 2022-05-13 DIAGNOSIS — Z72.0: ICD-10-CM

## 2022-05-13 DIAGNOSIS — K21.9: ICD-10-CM

## 2022-05-13 DIAGNOSIS — M79.12: Primary | ICD-10-CM

## 2022-05-13 DIAGNOSIS — I25.10: ICD-10-CM

## 2022-05-13 DIAGNOSIS — M19.90: ICD-10-CM

## 2022-05-13 DIAGNOSIS — F41.9: ICD-10-CM

## 2022-05-13 DIAGNOSIS — J44.9: ICD-10-CM

## 2022-05-13 PROCEDURE — 20552 NJX 1/MLT TRIGGER POINT 1/2: CPT

## 2022-05-17 ENCOUNTER — HOSPITAL ENCOUNTER (OUTPATIENT)
Age: 57
End: 2022-05-17
Payer: MEDICAID

## 2022-05-17 DIAGNOSIS — J06.9: ICD-10-CM

## 2022-05-17 DIAGNOSIS — Z11.52: Primary | ICD-10-CM

## 2022-05-17 DIAGNOSIS — J44.1: ICD-10-CM

## 2022-05-17 PROCEDURE — C9803 HOPD COVID-19 SPEC COLLECT: HCPCS

## 2022-05-17 PROCEDURE — U0005 INFEC AGEN DETEC AMPLI PROBE: HCPCS

## 2022-05-17 PROCEDURE — U0003 INFECTIOUS AGENT DETECTION BY NUCLEIC ACID (DNA OR RNA); SEVERE ACUTE RESPIRATORY SYNDROME CORONAVIRUS 2 (SARS-COV-2) (CORONAVIRUS DISEASE [COVID-19]), AMPLIFIED PROBE TECHNIQUE, MAKING USE OF HIGH THROUGHPUT TECHNOLOGIES AS DESCRIBED BY CMS-2020-01-R: HCPCS

## 2022-06-16 ENCOUNTER — HOSPITAL ENCOUNTER (OUTPATIENT)
Age: 57
End: 2022-06-16
Payer: MEDICAID

## 2022-06-16 VITALS
DIASTOLIC BLOOD PRESSURE: 84 MMHG | TEMPERATURE: 98.42 F | OXYGEN SATURATION: 97 % | RESPIRATION RATE: 20 BRPM | HEART RATE: 85 BPM | SYSTOLIC BLOOD PRESSURE: 131 MMHG

## 2022-06-16 VITALS — BODY MASS INDEX: 29.5 KG/M2

## 2022-06-16 DIAGNOSIS — M70.62: ICD-10-CM

## 2022-06-16 DIAGNOSIS — M79.18: ICD-10-CM

## 2022-06-16 DIAGNOSIS — M46.1: Primary | ICD-10-CM

## 2022-06-16 PROCEDURE — 99212 OFFICE O/P EST SF 10 MIN: CPT

## 2022-06-16 PROCEDURE — G0463 HOSPITAL OUTPT CLINIC VISIT: HCPCS

## 2022-07-01 ENCOUNTER — HOSPITAL ENCOUNTER (OUTPATIENT)
Dept: HOSPITAL 22 - SC.PAINP | Age: 57
Discharge: HOME | End: 2022-07-01
Payer: MEDICAID

## 2022-07-01 VITALS
DIASTOLIC BLOOD PRESSURE: 73 MMHG | HEART RATE: 89 BPM | RESPIRATION RATE: 18 BRPM | SYSTOLIC BLOOD PRESSURE: 151 MMHG | OXYGEN SATURATION: 98 %

## 2022-07-01 VITALS
HEART RATE: 89 BPM | RESPIRATION RATE: 18 BRPM | OXYGEN SATURATION: 95 % | SYSTOLIC BLOOD PRESSURE: 125 MMHG | DIASTOLIC BLOOD PRESSURE: 72 MMHG | TEMPERATURE: 98.5 F

## 2022-07-01 VITALS
RESPIRATION RATE: 20 BRPM | OXYGEN SATURATION: 93 % | SYSTOLIC BLOOD PRESSURE: 115 MMHG | HEART RATE: 97 BPM | DIASTOLIC BLOOD PRESSURE: 69 MMHG

## 2022-07-01 VITALS
RESPIRATION RATE: 20 BRPM | SYSTOLIC BLOOD PRESSURE: 125 MMHG | HEART RATE: 103 BPM | DIASTOLIC BLOOD PRESSURE: 62 MMHG | OXYGEN SATURATION: 95 %

## 2022-07-01 VITALS — BODY MASS INDEX: 28.5 KG/M2

## 2022-07-01 DIAGNOSIS — M46.1: Primary | ICD-10-CM

## 2022-07-01 DIAGNOSIS — M70.62: ICD-10-CM

## 2022-07-01 PROCEDURE — 27096 INJECT SACROILIAC JOINT: CPT

## 2022-07-01 PROCEDURE — G0260 INJ FOR SACROILIAC JT ANESTH: HCPCS

## 2022-07-05 ENCOUNTER — HOSPITAL ENCOUNTER (OUTPATIENT)
Age: 57
End: 2022-07-05
Payer: MEDICAID

## 2022-07-05 VITALS — HEART RATE: 64 BPM

## 2022-07-05 DIAGNOSIS — R06.02: ICD-10-CM

## 2022-07-05 DIAGNOSIS — Z87.891: Primary | ICD-10-CM

## 2022-07-05 DIAGNOSIS — Z12.2: ICD-10-CM

## 2022-07-05 PROCEDURE — 94729 DIFFUSING CAPACITY: CPT

## 2022-07-05 PROCEDURE — 94727 GAS DIL/WSHOT DETER LNG VOL: CPT

## 2022-07-05 PROCEDURE — 94640 AIRWAY INHALATION TREATMENT: CPT

## 2022-07-05 PROCEDURE — 94618 PULMONARY STRESS TESTING: CPT

## 2022-07-05 PROCEDURE — 71271 CT THORAX LUNG CANCER SCR C-: CPT

## 2022-07-05 PROCEDURE — 94060 EVALUATION OF WHEEZING: CPT

## 2022-08-11 ENCOUNTER — HOSPITAL ENCOUNTER (OUTPATIENT)
Age: 57
End: 2022-08-11
Payer: MEDICAID

## 2022-08-11 VITALS — SYSTOLIC BLOOD PRESSURE: 150 MMHG | DIASTOLIC BLOOD PRESSURE: 88 MMHG | RESPIRATION RATE: 20 BRPM | HEART RATE: 85 BPM

## 2022-08-11 VITALS — BODY MASS INDEX: 29.5 KG/M2

## 2022-08-11 DIAGNOSIS — M79.18: ICD-10-CM

## 2022-08-11 DIAGNOSIS — M46.1: Primary | ICD-10-CM

## 2022-08-11 DIAGNOSIS — M70.60: ICD-10-CM

## 2022-08-11 DIAGNOSIS — M25.511: ICD-10-CM

## 2022-08-11 PROCEDURE — 99212 OFFICE O/P EST SF 10 MIN: CPT

## 2022-08-11 PROCEDURE — G0463 HOSPITAL OUTPT CLINIC VISIT: HCPCS

## 2022-09-01 ENCOUNTER — HOSPITAL ENCOUNTER (OUTPATIENT)
Age: 57
Discharge: HOME | End: 2022-09-01
Payer: MEDICAID

## 2022-09-01 VITALS
OXYGEN SATURATION: 96 % | SYSTOLIC BLOOD PRESSURE: 137 MMHG | TEMPERATURE: 98.78 F | RESPIRATION RATE: 18 BRPM | HEART RATE: 89 BPM | DIASTOLIC BLOOD PRESSURE: 95 MMHG

## 2022-09-01 VITALS — BODY MASS INDEX: 29.5 KG/M2

## 2022-09-01 DIAGNOSIS — M51.36: Primary | ICD-10-CM

## 2022-09-01 DIAGNOSIS — M79.18: ICD-10-CM

## 2022-09-01 DIAGNOSIS — M54.2: ICD-10-CM

## 2022-09-01 DIAGNOSIS — M70.60: ICD-10-CM

## 2022-09-01 PROCEDURE — G0463 HOSPITAL OUTPT CLINIC VISIT: HCPCS

## 2022-09-01 PROCEDURE — 99212 OFFICE O/P EST SF 10 MIN: CPT

## 2022-09-29 ENCOUNTER — HOSPITAL ENCOUNTER (OUTPATIENT)
Age: 57
End: 2022-09-29
Payer: MEDICAID

## 2022-09-29 DIAGNOSIS — M25.511: ICD-10-CM

## 2022-09-29 DIAGNOSIS — M54.2: Primary | ICD-10-CM

## 2022-09-29 PROCEDURE — 72141 MRI NECK SPINE W/O DYE: CPT

## 2022-09-29 PROCEDURE — 73221 MRI JOINT UPR EXTREM W/O DYE: CPT

## 2022-09-29 PROCEDURE — 76376 3D RENDER W/INTRP POSTPROCES: CPT

## 2022-10-06 ENCOUNTER — HOSPITAL ENCOUNTER (OUTPATIENT)
Age: 57
Discharge: HOME | End: 2022-10-06
Payer: MEDICAID

## 2022-10-06 VITALS
RESPIRATION RATE: 18 BRPM | SYSTOLIC BLOOD PRESSURE: 148 MMHG | TEMPERATURE: 98.24 F | HEART RATE: 83 BPM | OXYGEN SATURATION: 97 % | DIASTOLIC BLOOD PRESSURE: 99 MMHG

## 2022-10-06 VITALS — BODY MASS INDEX: 29.3 KG/M2

## 2022-10-06 DIAGNOSIS — M46.1: ICD-10-CM

## 2022-10-06 DIAGNOSIS — M79.18: ICD-10-CM

## 2022-10-06 DIAGNOSIS — M51.16: Primary | ICD-10-CM

## 2022-10-06 DIAGNOSIS — M70.60: ICD-10-CM

## 2022-10-06 DIAGNOSIS — M50.122: ICD-10-CM

## 2022-10-06 PROCEDURE — G0463 HOSPITAL OUTPT CLINIC VISIT: HCPCS

## 2022-10-06 PROCEDURE — 99212 OFFICE O/P EST SF 10 MIN: CPT

## 2022-10-07 ENCOUNTER — HOSPITAL ENCOUNTER (OUTPATIENT)
Age: 57
End: 2022-10-07
Payer: MEDICAID

## 2022-10-07 DIAGNOSIS — I65.23: ICD-10-CM

## 2022-10-07 DIAGNOSIS — R06.00: Primary | ICD-10-CM

## 2022-10-07 DIAGNOSIS — J45.909: ICD-10-CM

## 2022-10-07 DIAGNOSIS — E78.2: ICD-10-CM

## 2022-10-07 DIAGNOSIS — Z72.0: ICD-10-CM

## 2022-10-07 DIAGNOSIS — R42: ICD-10-CM

## 2022-10-07 LAB
ALBUMIN LEVEL: 3.8 G/DL (ref 3.5–5)
ALP ISO SERPL-ACNC: 63 U/L (ref 38–126)
ALT SERPLBLD-CCNC: 17 U/L (ref 12–78)
AST SERPL QL: 25 U/L (ref 14–36)
BILIRUB DIRECT SERPL-MCNC: 0 MG/DL (ref 0–0.4)
BILIRUB INDIRECT SERPL-MCNC: 0 MG/DL (ref 0–1.1)
BILIRUB INDIRECT SERPL-MCNC: 0.1 MG/DL (ref 0–0.9)
BILIRUBIN,TOTAL: < 0.1 MG/DL (ref 0.2–1.3)
CHOLEST SPEC-SCNC: 120 MG/DL (ref 140–200)
HCT VFR BLD CALC: 43.3 % (ref 37–47)
HDLC SERPL-MCNC: 38 MG/DL (ref 40–60)
HGB BLD-MCNC: 13.3 G/DL (ref 12.2–16.2)
MCHC RBC-ENTMCNC: 30.6 G/DL (ref 31.8–35.4)
MCV RBC: 100.2 FL (ref 81–99)
MEAN CORPUSCULAR HEMOGLOBIN: 30.6 PG (ref 27–31.2)
PLATELET # BLD: 438 K/MM3 (ref 142–424)
PROT SERPL-MCNC: 6.3 G/DL (ref 6.3–8.2)
RBC # BLD AUTO: 4.33 M/MM3 (ref 4.2–5.4)
TRIGLYCERIDES: 85 MG/DL (ref 30–150)
WBC # BLD AUTO: 8.3 K/MM3 (ref 4.8–10.8)

## 2022-10-07 PROCEDURE — 82785 ASSAY OF IGE: CPT

## 2022-10-07 PROCEDURE — 80076 HEPATIC FUNCTION PANEL: CPT

## 2022-10-07 PROCEDURE — 85025 COMPLETE CBC W/AUTO DIFF WBC: CPT

## 2022-10-07 PROCEDURE — 86003 ALLG SPEC IGE CRUDE XTRC EA: CPT

## 2022-10-07 PROCEDURE — 80061 LIPID PANEL: CPT

## 2022-10-07 PROCEDURE — 36415 COLL VENOUS BLD VENIPUNCTURE: CPT

## 2022-10-14 LAB
I006-IGE COCKROACH, GERMAN: <0.1 KU/L
T006-IGE CEDAR, MOUNTAIN: <0.1 KU/L
T007-IGE OAK, WHITE: <0.1 KU/L
T008-IGE ELM, AMERICAN: <0.1 KU/L
T015-IGE ASH, WHITE: <0.1 KU/L
T022-IGE PECAN, HICKORY: <0.1 KU/L
W001-IGE RAGWEED, SHORT: <0.1 KU/L
W011-IGE THISTLE, RUSSIAN: <0.1 KU/L
W014-IGE PIGWEED, COMMON: <0.1 KU/L

## 2022-10-18 ENCOUNTER — HOSPITAL ENCOUNTER (OUTPATIENT)
Dept: HOSPITAL 22 - SC.PAINP | Age: 57
Discharge: HOME | End: 2022-10-18
Payer: MEDICAID

## 2022-10-18 VITALS
SYSTOLIC BLOOD PRESSURE: 122 MMHG | HEART RATE: 73 BPM | OXYGEN SATURATION: 95 % | RESPIRATION RATE: 20 BRPM | DIASTOLIC BLOOD PRESSURE: 78 MMHG

## 2022-10-18 VITALS
HEART RATE: 75 BPM | TEMPERATURE: 98.06 F | DIASTOLIC BLOOD PRESSURE: 80 MMHG | SYSTOLIC BLOOD PRESSURE: 119 MMHG | OXYGEN SATURATION: 96 % | RESPIRATION RATE: 18 BRPM

## 2022-10-18 VITALS
OXYGEN SATURATION: 98 % | SYSTOLIC BLOOD PRESSURE: 128 MMHG | RESPIRATION RATE: 18 BRPM | DIASTOLIC BLOOD PRESSURE: 78 MMHG | HEART RATE: 75 BPM

## 2022-10-18 VITALS — BODY MASS INDEX: 29.5 KG/M2

## 2022-10-18 DIAGNOSIS — Z72.0: ICD-10-CM

## 2022-10-18 DIAGNOSIS — M51.16: ICD-10-CM

## 2022-10-18 DIAGNOSIS — M79.18: ICD-10-CM

## 2022-10-18 DIAGNOSIS — M70.60: ICD-10-CM

## 2022-10-18 DIAGNOSIS — M46.1: ICD-10-CM

## 2022-10-18 DIAGNOSIS — M50.10: Primary | ICD-10-CM

## 2022-10-18 PROCEDURE — G0260 INJ FOR SACROILIAC JT ANESTH: HCPCS

## 2022-10-18 PROCEDURE — 20610 DRAIN/INJ JOINT/BURSA W/O US: CPT

## 2022-10-18 PROCEDURE — 27096 INJECT SACROILIAC JOINT: CPT

## 2022-10-26 ENCOUNTER — HOSPITAL ENCOUNTER (OUTPATIENT)
Age: 57
End: 2022-10-26
Payer: MEDICAID

## 2022-10-26 DIAGNOSIS — Z79.899: Primary | ICD-10-CM

## 2022-10-26 PROCEDURE — 80305 DRUG TEST PRSMV DIR OPT OBS: CPT

## 2022-11-14 ENCOUNTER — HOSPITAL ENCOUNTER (OUTPATIENT)
Age: 57
End: 2022-11-14
Payer: MEDICAID

## 2022-11-14 VITALS
HEART RATE: 77 BPM | OXYGEN SATURATION: 96 % | SYSTOLIC BLOOD PRESSURE: 120 MMHG | RESPIRATION RATE: 18 BRPM | DIASTOLIC BLOOD PRESSURE: 70 MMHG

## 2022-11-14 VITALS — BODY MASS INDEX: 29.5 KG/M2

## 2022-11-14 DIAGNOSIS — M46.1: ICD-10-CM

## 2022-11-14 DIAGNOSIS — M79.18: ICD-10-CM

## 2022-11-14 DIAGNOSIS — M51.16: Primary | ICD-10-CM

## 2022-11-14 DIAGNOSIS — M50.10: ICD-10-CM

## 2022-11-14 DIAGNOSIS — Z72.0: ICD-10-CM

## 2022-11-14 PROCEDURE — G0463 HOSPITAL OUTPT CLINIC VISIT: HCPCS

## 2022-11-14 PROCEDURE — 99212 OFFICE O/P EST SF 10 MIN: CPT

## 2023-01-12 ENCOUNTER — HOSPITAL ENCOUNTER (OUTPATIENT)
Age: 58
End: 2023-01-12
Payer: COMMERCIAL

## 2023-01-12 DIAGNOSIS — R06.02: Primary | ICD-10-CM

## 2023-01-12 DIAGNOSIS — J44.9: ICD-10-CM

## 2023-01-12 DIAGNOSIS — E66.3: ICD-10-CM

## 2023-01-12 DIAGNOSIS — M51.36: ICD-10-CM

## 2023-01-12 LAB
ALBUMIN LEVEL: 4.1 G/DL (ref 3.5–5)
ALBUMIN/GLOB SERPL: 1.7 {RATIO} (ref 1.1–1.8)
ALP ISO SERPL-ACNC: 53 U/L (ref 38–126)
ALT SERPLBLD-CCNC: 18 U/L (ref 12–78)
ANION GAP SERPL CALC-SCNC: 11.3 MEQ/L (ref 5–15)
AST SERPL QL: 24 U/L (ref 14–36)
BILIRUBIN,TOTAL: 0.3 MG/DL (ref 0.2–1.3)
BUN SERPL-MCNC: 10 MG/DL (ref 7–17)
CALCIUM SPEC-MCNC: 9.3 MG/DL (ref 8.4–10.2)
CHLORIDE SPEC-SCNC: 105 MMOL/L (ref 98–107)
CO2 SERPL-SCNC: 26 MMOL/L (ref 22–30)
CREAT BLD-SCNC: 0.7 MG/DL (ref 0.52–1.04)
ESTIMATED GLOMERULAR FILT RATE: 87 ML/MIN (ref 60–?)
GFR (AFRICAN AMERICAN): 105 ML/MIN (ref 60–?)
GLOBULIN SER CALC-MCNC: 2.4 G/DL (ref 1.3–3.2)
GLUCOSE: 95 MG/DL (ref 74–100)
HCT VFR BLD CALC: 44.9 % (ref 37–47)
HGB BLD-MCNC: 13.6 G/DL (ref 12.2–16.2)
MCHC RBC-ENTMCNC: 30.3 G/DL (ref 31.8–35.4)
MCV RBC: 101.6 FL (ref 81–99)
MEAN CORPUSCULAR HEMOGLOBIN: 30.8 PG (ref 27–31.2)
PLATELET # BLD: 578 K/MM3 (ref 142–424)
POTASSIUM: 4.3 MMOL/L (ref 3.5–5.1)
PROT SERPL-MCNC: 6.5 G/DL (ref 6.3–8.2)
RBC # BLD AUTO: 4.42 M/MM3 (ref 4.2–5.4)
SODIUM SPEC-SCNC: 138 MMOL/L (ref 136–145)
URATE SERPL-SCNC: 4.2 MG/DL (ref 2.5–6.2)
WBC # BLD AUTO: 9.6 K/MM3 (ref 4.8–10.8)

## 2023-01-12 PROCEDURE — 87476 LYME DIS DNA AMP PROBE: CPT

## 2023-01-12 PROCEDURE — 86038 ANTINUCLEAR ANTIBODIES: CPT

## 2023-01-12 PROCEDURE — 71046 X-RAY EXAM CHEST 2 VIEWS: CPT

## 2023-01-12 PROCEDURE — 86431 RHEUMATOID FACTOR QUANT: CPT

## 2023-01-12 PROCEDURE — 36415 COLL VENOUS BLD VENIPUNCTURE: CPT

## 2023-01-12 PROCEDURE — 86780 TREPONEMA PALLIDUM: CPT

## 2023-01-12 PROCEDURE — 80053 COMPREHEN METABOLIC PANEL: CPT

## 2023-01-12 PROCEDURE — 85651 RBC SED RATE NONAUTOMATED: CPT

## 2023-01-12 PROCEDURE — 84550 ASSAY OF BLOOD/URIC ACID: CPT

## 2023-01-12 PROCEDURE — 86593 SYPHILIS TEST NON-TREP QUANT: CPT

## 2023-01-12 PROCEDURE — 85025 COMPLETE CBC W/AUTO DIFF WBC: CPT

## 2023-01-13 LAB — RA LATEX TURBID.: 79 IU/ML (ref ?–14)

## 2023-02-10 ENCOUNTER — HOSPITAL ENCOUNTER (OUTPATIENT)
Age: 58
End: 2023-02-10
Payer: COMMERCIAL

## 2023-02-10 DIAGNOSIS — Z79.899: Primary | ICD-10-CM

## 2023-02-10 PROCEDURE — 80305 DRUG TEST PRSMV DIR OPT OBS: CPT

## 2023-03-02 ENCOUNTER — HOSPITAL ENCOUNTER (OUTPATIENT)
Age: 58
End: 2023-03-02
Payer: COMMERCIAL

## 2023-03-02 DIAGNOSIS — D47.1: Primary | ICD-10-CM

## 2023-03-02 LAB
FERRITIN SERPL-MCNC: 8.68 NG/ML (ref 11.1–264)
HCT VFR BLD CALC: 43 % (ref 37–47)
HGB BLD-MCNC: 13.9 G/DL (ref 12.2–16.2)
IRON SERPL QL: 43 UG/DL (ref 37–170)
MCHC RBC-ENTMCNC: 32.3 G/DL (ref 31.8–35.4)
MCV RBC: 99.1 FL (ref 81–99)
MEAN CORPUSCULAR HEMOGLOBIN: 32 PG (ref 27–31.2)
PLATELET # BLD: 427 K/MM3 (ref 142–424)
RBC # BLD AUTO: 4.34 M/MM3 (ref 4.2–5.4)
TOTAL IRON BINDING CAPACITY: 343 UG/DL (ref 265–497)
WBC # BLD AUTO: 8.4 K/MM3 (ref 4.8–10.8)

## 2023-03-02 PROCEDURE — 81270 JAK2 GENE: CPT

## 2023-03-02 PROCEDURE — 82728 ASSAY OF FERRITIN: CPT

## 2023-03-02 PROCEDURE — 83550 IRON BINDING TEST: CPT

## 2023-03-02 PROCEDURE — 85025 COMPLETE CBC W/AUTO DIFF WBC: CPT

## 2023-03-02 PROCEDURE — 83540 ASSAY OF IRON: CPT

## 2023-03-02 PROCEDURE — 36415 COLL VENOUS BLD VENIPUNCTURE: CPT

## 2023-04-04 ENCOUNTER — HOSPITAL ENCOUNTER (OUTPATIENT)
Age: 58
End: 2023-04-04
Payer: COMMERCIAL

## 2023-04-04 DIAGNOSIS — Z79.899: Primary | ICD-10-CM

## 2023-04-04 PROCEDURE — 80305 DRUG TEST PRSMV DIR OPT OBS: CPT

## 2023-04-20 ENCOUNTER — OFFICE VISIT (OUTPATIENT)
Dept: CARDIOLOGY | Facility: CLINIC | Age: 58
End: 2023-04-20
Payer: MEDICAID

## 2023-04-20 VITALS
HEART RATE: 76 BPM | DIASTOLIC BLOOD PRESSURE: 72 MMHG | SYSTOLIC BLOOD PRESSURE: 138 MMHG | BODY MASS INDEX: 29.41 KG/M2 | HEIGHT: 66 IN | OXYGEN SATURATION: 96 % | WEIGHT: 183 LBS

## 2023-04-20 DIAGNOSIS — J44.9 CHRONIC OBSTRUCTIVE PULMONARY DISEASE, UNSPECIFIED COPD TYPE: ICD-10-CM

## 2023-04-20 DIAGNOSIS — G47.33 OSA (OBSTRUCTIVE SLEEP APNEA): Primary | ICD-10-CM

## 2023-04-20 DIAGNOSIS — R07.2 PRECORDIAL CHEST PAIN: ICD-10-CM

## 2023-04-20 DIAGNOSIS — E78.00 ELEVATED CHOLESTEROL: ICD-10-CM

## 2023-04-20 DIAGNOSIS — R06.02 SHORTNESS OF BREATH: ICD-10-CM

## 2023-04-20 PROBLEM — F17.200 SMOKER: Status: ACTIVE | Noted: 2023-04-20

## 2023-04-20 RX ORDER — CHOLECALCIFEROL (VITAMIN D3) 1250 MCG
CAPSULE ORAL
COMMUNITY
Start: 2023-04-06

## 2023-04-20 RX ORDER — HYDROCODONE BITARTRATE AND ACETAMINOPHEN 10; 325 MG/1; MG/1
1 TABLET ORAL 3 TIMES DAILY
COMMUNITY
Start: 2023-04-08

## 2023-04-20 RX ORDER — GABAPENTIN 800 MG/1
1 TABLET ORAL 3 TIMES DAILY
COMMUNITY
Start: 2023-04-18

## 2023-04-20 RX ORDER — FAMOTIDINE 20 MG/1
1 TABLET, FILM COATED ORAL DAILY
COMMUNITY
Start: 2023-04-06

## 2023-04-20 RX ORDER — FUROSEMIDE 20 MG/1
TABLET ORAL
COMMUNITY
Start: 2023-04-12

## 2023-04-20 RX ORDER — FEXOFENADINE HYDROCHLORIDE 180 MG/1
TABLET, FILM COATED ORAL
COMMUNITY
Start: 2023-04-06

## 2023-04-20 RX ORDER — ROSUVASTATIN CALCIUM 20 MG/1
TABLET, COATED ORAL
COMMUNITY
Start: 2023-04-06

## 2023-04-20 RX ORDER — OMEPRAZOLE 40 MG/1
1 CAPSULE, DELAYED RELEASE ORAL EVERY 12 HOURS SCHEDULED
COMMUNITY
Start: 2023-04-06

## 2023-04-20 RX ORDER — MELATONIN 10 MG
CAPSULE ORAL
COMMUNITY
Start: 2023-04-04

## 2023-04-20 RX ORDER — RISPERIDONE 1 MG/1
1 TABLET ORAL NIGHTLY
COMMUNITY
Start: 2023-04-08

## 2023-04-20 RX ORDER — BUSPIRONE HYDROCHLORIDE 15 MG/1
TABLET ORAL
COMMUNITY
Start: 2023-04-08

## 2023-04-20 RX ORDER — FERROUS SULFATE 325(65) MG
1 TABLET ORAL EVERY 12 HOURS SCHEDULED
COMMUNITY
Start: 2023-04-04

## 2023-04-20 RX ORDER — FLUTICASONE FUROATE, UMECLIDINIUM BROMIDE AND VILANTEROL TRIFENATATE 100; 62.5; 25 UG/1; UG/1; UG/1
POWDER RESPIRATORY (INHALATION)
COMMUNITY
Start: 2023-04-06

## 2023-04-20 NOTE — PROGRESS NOTES
[facility] Provider Note     Date:   2023  Name: Nakita Batres  :   1965  PCP: Bud Giraldo MD    Chief Complaint   Patient presents with   • Sleep Apnea       Subjective     History of Present Illness  Nakita Batres is a 58 y.o. female who presents today for follow-up on obstructive sleep apnea.  Patient is doing well on PAP therapy with good control compliance.  AHI on download is 0.7.  She is wearing a full facemask and doing well with that.  Airflow is comfortable.  Denies excessive daytime sleepiness or napping.  She has benefiting from PAP therapy.  Patient reports worsening shortness of breath and occasional chest tightness on exertion.  Previous echocardiogram showed aortic regurgitation.  No prior history of CAD, MI or CHF.  She has COPD that is currently stable.    History of CHERISE with a baseline AHI of 6.   Last titration was 2021-12cm      Current Treatment: Auto CPAP 8-14 cm  Device Download 3/21/2023 - 2023.  AHI on download is 0.7.  Compliance on download is 97%.  When used >4 hours is 20%.   Average use per night is 3 hours and 6 minutes.  Current mask used is full face mask      PMH  COPD on oxygen at bedtime  Acid reflux  Anxiety  Arthritis  High cholesterol  Obstructive sleep apnea    Cardiac testing  *EKG 2023- normal sinus rhythm heart rate 70.  Normal EKG    No Known Allergies    Current Outpatient Medications:   •  albuterol (PROVENTIL) (2.5 MG/3ML) 0.083% nebulizer solution, Take 2.5 mg by nebulization Every 4 (Four) Hours As Needed for Wheezing., Disp: , Rfl:   •  Allergy Relief 180 MG tablet, for ALLERGIES; Take 1 tablet(s) by mouth daily, Disp: , Rfl:   •  busPIRone (BUSPAR) 15 MG tablet, Take one (1) tablet by mouth three times a day, Disp: , Rfl:   •  busPIRone (BUSPAR) 5 MG tablet, Take one (1) tablet by mouth three times a day, Disp: , Rfl:   •  Cholecalciferol (Vitamin D3) 1.25 MG (40763 UT) capsule, TAKE ONE  CAPSULE BY MOUTH WEEKLY AS DIRECTED, Disp: , Rfl:   •  citalopram (CeleXA) 20 MG tablet, Take 1 tablet by mouth Daily., Disp: , Rfl:   •  estradiol (ESTRACE) 1 MG tablet, Take 1 tablet by mouth Daily., Disp: , Rfl:   •  famotidine (PEPCID) 20 MG tablet, Take 1 tablet by mouth Daily., Disp: , Rfl:   •  FeroSul 325 (65 Fe) MG tablet, Take 1 tablet by mouth Every 12 (Twelve) Hours., Disp: , Rfl:   •  Fexofenadine HCl (ALLEGRA PO), Take  by mouth., Disp: , Rfl:   •  furosemide (LASIX) 20 MG tablet, TAKE ONE TABLET BY MOUTH DAILY FOR IN FLUID retention, Disp: , Rfl:   •  gabapentin (NEURONTIN) 800 MG tablet, Take 1 tablet by mouth 3 (Three) Times a Day., Disp: , Rfl:   •  HYDROcodone-acetaminophen (NORCO)  MG per tablet, Take 1 tablet by mouth 3 (Three) Times a Day., Disp: , Rfl:   •  Melatonin 10 MG capsule, Take one (1) capsule by mouth at bedtime, Disp: , Rfl:   •  metoprolol succinate XL (TOPROL-XL) 25 MG 24 hr tablet, Take 1 tablet by mouth Daily., Disp: , Rfl:   •  norethindrone (AYGESTIN) 5 MG tablet, Take 1 tablet by mouth Daily., Disp: , Rfl:   •  omeprazole (priLOSEC) 40 MG capsule, Take 1 capsule by mouth Every 12 (Twelve) Hours., Disp: , Rfl:   •  PANTOPRAZOLE SODIUM PO, Take  by mouth., Disp: , Rfl:   •  risperiDONE (risperDAL) 1 MG tablet, Take 1 tablet by mouth Every Night., Disp: , Rfl:   •  rosuvastatin (CRESTOR) 20 MG tablet, TAKE 1 TABLET BY MOUTH daily for CHOLOESTEROL, Disp: , Rfl:   •  Tiotropium Bromide-Olodaterol (STIOLTO RESPIMAT IN), Inhale., Disp: , Rfl:   •  Trelegy Ellipta 100-62.5-25 MCG/ACT inhaler, USE ONE PUFF DAILY, Disp: , Rfl:     Current Facility-Administered Medications:   •  ipratropium-albuterol (DUO-NEB) nebulizer solution 3 mL, 3 mL, Nebulization, 4x Daily - RT, Trina Cuello APRN    Past Medical History:   Diagnosis Date   • Acid reflux    • Allergic    • Anxiety    • Arthritis    • COPD (chronic obstructive pulmonary disease)     oxygen at night   • Elevated  "cholesterol    • CHERISE (obstructive sleep apnea)    • Oxygen dependent     at night   • Smoker       Past Surgical History:   Procedure Laterality Date   • VOCAL CORD STRIPPING       History reviewed. No pertinent family history.  Social History     Socioeconomic History   • Marital status:    Tobacco Use   • Smoking status: Every Day     Packs/day: 1.00     Years: 30.00     Pack years: 30.00     Types: Cigarettes   Substance and Sexual Activity   • Alcohol use: Not Currently   • Drug use: Not Currently   • Sexual activity: Defer       Objective     Vital Signs:  /72   Pulse 76   Ht 167.6 cm (66\")   Wt 83 kg (183 lb)   SpO2 96%   BMI 29.54 kg/m²   Estimated body mass index is 29.54 kg/m² as calculated from the following:    Height as of this encounter: 167.6 cm (66\").    Weight as of this encounter: 83 kg (183 lb).       BMI is >= 25 and <30. (Overweight) The following options were offered after discussion;: weight loss educational material (shared in after visit summary) and exercise counseling/recommendations      Physical Exam  Vitals reviewed.   Constitutional:       Appearance: Normal appearance. She is well-developed.   HENT:      Head: Normocephalic and atraumatic.   Eyes:      Pupils: Pupils are equal, round, and reactive to light.   Neck:      Vascular: No carotid bruit.   Cardiovascular:      Rate and Rhythm: Normal rate and regular rhythm.      Pulses: Normal pulses.           Dorsalis pedis pulses are 2+ on the right side and 2+ on the left side.        Posterior tibial pulses are 2+ on the right side and 2+ on the left side.      Heart sounds: Normal heart sounds. No murmur heard.  Pulmonary:      Breath sounds: Normal breath sounds. No wheezing or rhonchi.   Musculoskeletal:      Right lower leg: No edema.      Left lower leg: No edema.   Skin:     Capillary Refill: Capillary refill takes less than 2 seconds.      Coloration: Skin is not cyanotic.      Nails: There is no clubbing. "   Neurological:      Mental Status: She is alert and oriented to person, place, and time.      Motor: No weakness.      Gait: Gait normal.   Psychiatric:         Mood and Affect: Mood normal.         Behavior: Behavior is cooperative.         Thought Content: Thought content normal.         Cognition and Memory: Memory normal.             PAP download reviewed: 3/21/2023 - 4/19/2023.      ECG 12 Lead    Date/Time: 4/20/2023 10:45 PM  Performed by: Marlyn Egan APRN  Authorized by: Marlyn Egan APRN   Comparison: not compared with previous ECG   Previous ECG: no previous ECG available  Rhythm: sinus rhythm  Rate: normal  Conduction: conduction normal  QRS axis: normal  Other: no other findings    Clinical impression: normal ECG             Assessment and Plan     Diagnoses and all orders for this visit:    1. CHERISE (obstructive sleep apnea) (Primary)  Assessment & Plan:  Doing well on PAP therapy with good control compliance.  AHI on download today is 0.7.  Patient is only sleeping approximately 3 hours every night.  Reviewed sleep hygiene.  Patient denies excessive daytime sleepiness or napping.  We will continue PAP therapy at current settings.    Orders:  -     PAP Therapy    2. Precordial chest pain  Assessment & Plan:  We will proceed with echocardiogram and stress test for further evaluation.    Orders:  -     Adult Transthoracic Echo Complete W/ Cont if Necessary Per Protocol; Future  -     Stress Test With Myocardial Perfusion One Day; Future  -     ECG 12 Lead    3. Shortness of breath  Assessment & Plan:  Multifactorial, likely COPD.  Nuclear stress test and echocardiogram to rule out cardiac cause of shortness of breath    Orders:  -     Adult Transthoracic Echo Complete W/ Cont if Necessary Per Protocol; Future  -     Stress Test With Myocardial Perfusion One Day; Future    4. Elevated cholesterol  Assessment & Plan:  Followed closely by primary care provider.  Continue Crestor 20 mg  daily.      5. Chronic obstructive pulmonary disease, unspecified COPD type  Assessment & Plan:  COPD is currently stable, denies recent exacerbations.  Continue current medications.  Stop smoking            Recommendations: ER if symptoms increase, Stop cigarettes and Report if any new/changing symptoms immediately          Follow Up  Return in about 5 weeks (around 5/25/2023) for Recheck after cardiac testing. .  Patient was given instructions and counseling regarding her condition or for health maintenance advice. Please see specific information pulled into the AVS if appropriate.

## 2023-04-21 NOTE — ASSESSMENT & PLAN NOTE
COPD is currently stable, denies recent exacerbations.  Continue current medications.  Stop smoking

## 2023-04-21 NOTE — ASSESSMENT & PLAN NOTE
Doing well on PAP therapy with good control compliance.  AHI on download today is 0.7.  Patient is only sleeping approximately 3 hours every night.  Reviewed sleep hygiene.  Patient denies excessive daytime sleepiness or napping.  We will continue PAP therapy at current settings.

## 2023-04-21 NOTE — ASSESSMENT & PLAN NOTE
Multifactorial, likely COPD.  Nuclear stress test and echocardiogram to rule out cardiac cause of shortness of breath

## 2023-05-10 ENCOUNTER — OUTSIDE FACILITY SERVICE (OUTPATIENT)
Dept: CARDIOLOGY | Facility: CLINIC | Age: 58
End: 2023-05-10
Payer: MEDICAID

## 2023-05-10 PROCEDURE — 93016 CV STRESS TEST SUPVJ ONLY: CPT | Performed by: INTERNAL MEDICINE

## 2023-05-10 PROCEDURE — 78452 HT MUSCLE IMAGE SPECT MULT: CPT | Performed by: INTERNAL MEDICINE

## 2023-05-16 DIAGNOSIS — R06.02 SHORTNESS OF BREATH: ICD-10-CM

## 2023-05-16 DIAGNOSIS — R07.2 PRECORDIAL CHEST PAIN: ICD-10-CM

## 2023-05-18 ENCOUNTER — OFFICE VISIT (OUTPATIENT)
Dept: CARDIOLOGY | Facility: CLINIC | Age: 58
End: 2023-05-18
Payer: MEDICAID

## 2023-05-18 VITALS
DIASTOLIC BLOOD PRESSURE: 90 MMHG | HEART RATE: 77 BPM | WEIGHT: 182 LBS | BODY MASS INDEX: 29.25 KG/M2 | SYSTOLIC BLOOD PRESSURE: 128 MMHG | OXYGEN SATURATION: 98 % | HEIGHT: 66 IN

## 2023-05-18 DIAGNOSIS — R06.02 SHORTNESS OF BREATH: Primary | ICD-10-CM

## 2023-05-18 DIAGNOSIS — R94.39 ABNORMAL NUCLEAR STRESS TEST: ICD-10-CM

## 2023-05-18 RX ORDER — OXCARBAZEPINE 300 MG/1
300 TABLET, FILM COATED ORAL 2 TIMES DAILY
COMMUNITY

## 2023-05-18 RX ORDER — ASPIRIN 81 MG/1
81 TABLET ORAL DAILY
COMMUNITY

## 2023-05-19 PROBLEM — R94.39 ABNORMAL NUCLEAR STRESS TEST: Status: ACTIVE | Noted: 2023-05-19

## 2023-05-19 NOTE — H&P (VIEW-ONLY)
[facility] Provider Note     Date:   2023   Name: Nakita Batres  :   1965  PCP: Bud Giraldo MD    Chief Complaint   Patient presents with   • Sleep Apnea       Subjective     History of Present Illness  Nakita Batres is a 58 y.o. female who presents today for follow-up on echo and stress test results.  At last office visit on 2023 for CHERISE follow-up, patient reported worsening shortness of breath and chest tightness on exertion.  She has no prior history of coronary artery disease, MI or CHF.  She has COPD and uses oxygen at night.  Nuclear stress test done on 2023 revealed moderate size anterior reversible defect consistent with ischemia.  Overall intermediate risk study.  Echocardiogram revealed LVEF 56-60%.  Mild aortic regurgitation.  Results of stress test discussed with patient.  She would like to proceed with left heart cath due to continued symptoms of worsening shortness of breath and chest tightness.      Past medical history  - COPD oxygen HS  - CHERISE-baseline AHI 6, Last titration was 2021-12cm      Current Treatment: Auto CPAP 8-14 cm  - High cholesterol  - Anxiety  - GERD    *Echocardiogram 2023-LVEF 56-60%.  Ventricular diastolic function is consistent with grade 1 impaired relaxation.  Mild aortic valvular regurgitation without stenosis.  RVSP is normal.    *Nuclear stress test 2023-normal nuclear ejection fraction.  Moderate-size anterior reversible defect consistent with ischemia.  Study limited by motion artifact, but overall intermediate risk.    EKG 2023-normal sinus rhythm, heart rate 70.    No Known Allergies    Current Outpatient Medications:   •  albuterol (PROVENTIL) (2.5 MG/3ML) 0.083% nebulizer solution, Take 2.5 mg by nebulization Every 4 (Four) Hours As Needed for Wheezing., Disp: , Rfl:   •  Allergy Relief 180 MG tablet, for ALLERGIES; Take 1 tablet(s) by mouth daily, Disp: , Rfl:   •  aspirin 81 MG EC  tablet, Take 1 tablet by mouth Daily., Disp: , Rfl:   •  busPIRone (BUSPAR) 15 MG tablet, Take one (1) tablet by mouth three times a day, Disp: , Rfl:   •  Cholecalciferol (Vitamin D3) 1.25 MG (66813 UT) capsule, TAKE ONE CAPSULE BY MOUTH WEEKLY AS DIRECTED, Disp: , Rfl:   •  estradiol (ESTRACE) 1 MG tablet, Take 1 tablet by mouth Daily., Disp: , Rfl:   •  famotidine (PEPCID) 20 MG tablet, Take 1 tablet by mouth Daily., Disp: , Rfl:   •  FeroSul 325 (65 Fe) MG tablet, Take 1 tablet by mouth Every 12 (Twelve) Hours., Disp: , Rfl:   •  furosemide (LASIX) 20 MG tablet, TAKE ONE TABLET BY MOUTH DAILY FOR IN FLUID retention, Disp: , Rfl:   •  gabapentin (NEURONTIN) 800 MG tablet, Take 1 tablet by mouth 3 (Three) Times a Day., Disp: , Rfl:   •  HYDROcodone-acetaminophen (NORCO)  MG per tablet, Take 1 tablet by mouth 3 (Three) Times a Day., Disp: , Rfl:   •  Melatonin 10 MG capsule, Take one (1) capsule by mouth at bedtime, Disp: , Rfl:   •  metoprolol succinate XL (TOPROL-XL) 25 MG 24 hr tablet, Take 1 tablet by mouth Daily., Disp: , Rfl:   •  norethindrone (AYGESTIN) 5 MG tablet, Take 1 tablet by mouth Daily., Disp: , Rfl:   •  omeprazole (priLOSEC) 40 MG capsule, Take 1 capsule by mouth Every 12 (Twelve) Hours., Disp: , Rfl:   •  OXcarbazepine (TRILEPTAL) 300 MG tablet, Take 1 tablet by mouth 2 (Two) Times a Day., Disp: , Rfl:   •  risperiDONE (risperDAL) 1 MG tablet, Take 1 tablet by mouth Every Night., Disp: , Rfl:   •  rosuvastatin (CRESTOR) 20 MG tablet, TAKE 1 TABLET BY MOUTH daily for CHOLOESTEROL, Disp: , Rfl:   •  Trelegy Ellipta 100-62.5-25 MCG/ACT inhaler, USE ONE PUFF DAILY, Disp: , Rfl:     Current Facility-Administered Medications:   •  ipratropium-albuterol (DUO-NEB) nebulizer solution 3 mL, 3 mL, Nebulization, 4x Daily - RT, Trina Cuello APRN    Past Medical History:   Diagnosis Date   • Acid reflux    • Allergic    • Anxiety    • Arthritis    • COPD (chronic obstructive pulmonary  "disease)     oxygen at night   • Elevated cholesterol    • CHERISE (obstructive sleep apnea)    • Oxygen dependent     at night   • Smoker       Past Surgical History:   Procedure Laterality Date   • VOCAL CORD STRIPPING       History reviewed. No pertinent family history.  Social History     Socioeconomic History   • Marital status:    Tobacco Use   • Smoking status: Every Day     Packs/day: 1.00     Years: 30.00     Pack years: 30.00     Types: Cigarettes   Substance and Sexual Activity   • Alcohol use: Not Currently   • Drug use: Not Currently   • Sexual activity: Defer       Objective     Vital Signs:  /90   Pulse 77   Ht 167.6 cm (66\")   Wt 82.6 kg (182 lb)   SpO2 98%   BMI 29.38 kg/m²   Estimated body mass index is 29.38 kg/m² as calculated from the following:    Height as of this encounter: 167.6 cm (66\").    Weight as of this encounter: 82.6 kg (182 lb).             Physical Exam  Vitals reviewed.   Constitutional:       Appearance: Normal appearance. She is well-developed.   HENT:      Head: Normocephalic and atraumatic.   Eyes:      Pupils: Pupils are equal, round, and reactive to light.   Neck:      Vascular: No carotid bruit.   Cardiovascular:      Rate and Rhythm: Normal rate and regular rhythm.      Pulses: Normal pulses.           Dorsalis pedis pulses are 2+ on the right side and 2+ on the left side.        Posterior tibial pulses are 2+ on the right side and 2+ on the left side.      Heart sounds: Normal heart sounds. No murmur heard.  Pulmonary:      Breath sounds: Normal breath sounds. No wheezing or rhonchi.   Musculoskeletal:      Right lower leg: No edema.      Left lower leg: No edema.   Skin:     Capillary Refill: Capillary refill takes less than 2 seconds.      Coloration: Skin is not cyanotic.      Nails: There is no clubbing.   Neurological:      Mental Status: She is alert and oriented to person, place, and time.      Motor: No weakness.      Gait: Gait normal. "   Psychiatric:         Mood and Affect: Mood normal.         Behavior: Behavior is cooperative.         Thought Content: Thought content normal.         Cognition and Memory: Memory normal.           Cardiology studies reviewed: Echo and nuclear stress test.          Assessment and Plan     Diagnoses and all orders for this visit:    1. Shortness of breath (Primary)  Assessment & Plan:  Worsening shortness of breath and abnormal stress test.  LHC to rule out cardiac etiology.    Orders:  -     Case Request Cath Lab: Left Heart Cath    2. Abnormal nuclear stress test  Assessment & Plan:  Nuclear stress test from 5/4/2023 revealed moderate size anterior reversible defect, consistent with ischemia.  Intermediate risk study.  Discussed coronary CTA and left heart cath with patient.  She prefers to have LHC for definitive diagnosis.    Orders:  -     Case Request Cath Lab: Left Heart Cath      Recommendations: ER if symptoms increase, Limitations of stress testing for definitive diagnosis reviewed and Report if any new/changing symptoms immediately          Follow Up  Return in about 5 weeks (around 6/22/2023) for Heart Cath follow up.  Patient was given instructions and counseling regarding her condition or for health maintenance advice. Please see specific information pulled into the AVS if appropriate.

## 2023-05-19 NOTE — ASSESSMENT & PLAN NOTE
Nuclear stress test from 5/4/2023 revealed moderate size anterior reversible defect, consistent with ischemia.  Intermediate risk study.  Discussed coronary CTA and left heart cath with patient.  She prefers to have [facility] for definitive diagnosis.

## 2023-05-19 NOTE — PROGRESS NOTES
[facility] Provider Note     Date:   2023   Name: Nakita Batres  :   1965  PCP: Bud Giraldo MD    Chief Complaint   Patient presents with   • Sleep Apnea       Subjective     History of Present Illness  Nakita Batres is a 58 y.o. female who presents today for follow-up on echo and stress test results.  At last office visit on 2023 for CHERISE follow-up, patient reported worsening shortness of breath and chest tightness on exertion.  She has no prior history of coronary artery disease, MI or CHF.  She has COPD and uses oxygen at night.  Nuclear stress test done on 2023 revealed moderate size anterior reversible defect consistent with ischemia.  Overall intermediate risk study.  Echocardiogram revealed LVEF 56-60%.  Mild aortic regurgitation.  Results of stress test discussed with patient.  She would like to proceed with left heart cath due to continued symptoms of worsening shortness of breath and chest tightness.      Past medical history  - COPD oxygen HS  - CHERISE-baseline AHI 6, Last titration was 2021-12cm      Current Treatment: Auto CPAP 8-14 cm  - High cholesterol  - Anxiety  - GERD    *Echocardiogram 2023-LVEF 56-60%.  Ventricular diastolic function is consistent with grade 1 impaired relaxation.  Mild aortic valvular regurgitation without stenosis.  RVSP is normal.    *Nuclear stress test 2023-normal nuclear ejection fraction.  Moderate-size anterior reversible defect consistent with ischemia.  Study limited by motion artifact, but overall intermediate risk.    EKG 2023-normal sinus rhythm, heart rate 70.    No Known Allergies    Current Outpatient Medications:   •  albuterol (PROVENTIL) (2.5 MG/3ML) 0.083% nebulizer solution, Take 2.5 mg by nebulization Every 4 (Four) Hours As Needed for Wheezing., Disp: , Rfl:   •  Allergy Relief 180 MG tablet, for ALLERGIES; Take 1 tablet(s) by mouth daily, Disp: , Rfl:   •  aspirin 81 MG EC  tablet, Take 1 tablet by mouth Daily., Disp: , Rfl:   •  busPIRone (BUSPAR) 15 MG tablet, Take one (1) tablet by mouth three times a day, Disp: , Rfl:   •  Cholecalciferol (Vitamin D3) 1.25 MG (61153 UT) capsule, TAKE ONE CAPSULE BY MOUTH WEEKLY AS DIRECTED, Disp: , Rfl:   •  estradiol (ESTRACE) 1 MG tablet, Take 1 tablet by mouth Daily., Disp: , Rfl:   •  famotidine (PEPCID) 20 MG tablet, Take 1 tablet by mouth Daily., Disp: , Rfl:   •  FeroSul 325 (65 Fe) MG tablet, Take 1 tablet by mouth Every 12 (Twelve) Hours., Disp: , Rfl:   •  furosemide (LASIX) 20 MG tablet, TAKE ONE TABLET BY MOUTH DAILY FOR IN FLUID retention, Disp: , Rfl:   •  gabapentin (NEURONTIN) 800 MG tablet, Take 1 tablet by mouth 3 (Three) Times a Day., Disp: , Rfl:   •  HYDROcodone-acetaminophen (NORCO)  MG per tablet, Take 1 tablet by mouth 3 (Three) Times a Day., Disp: , Rfl:   •  Melatonin 10 MG capsule, Take one (1) capsule by mouth at bedtime, Disp: , Rfl:   •  metoprolol succinate XL (TOPROL-XL) 25 MG 24 hr tablet, Take 1 tablet by mouth Daily., Disp: , Rfl:   •  norethindrone (AYGESTIN) 5 MG tablet, Take 1 tablet by mouth Daily., Disp: , Rfl:   •  omeprazole (priLOSEC) 40 MG capsule, Take 1 capsule by mouth Every 12 (Twelve) Hours., Disp: , Rfl:   •  OXcarbazepine (TRILEPTAL) 300 MG tablet, Take 1 tablet by mouth 2 (Two) Times a Day., Disp: , Rfl:   •  risperiDONE (risperDAL) 1 MG tablet, Take 1 tablet by mouth Every Night., Disp: , Rfl:   •  rosuvastatin (CRESTOR) 20 MG tablet, TAKE 1 TABLET BY MOUTH daily for CHOLOESTEROL, Disp: , Rfl:   •  Trelegy Ellipta 100-62.5-25 MCG/ACT inhaler, USE ONE PUFF DAILY, Disp: , Rfl:     Current Facility-Administered Medications:   •  ipratropium-albuterol (DUO-NEB) nebulizer solution 3 mL, 3 mL, Nebulization, 4x Daily - RT, Trina Cuello APRN    Past Medical History:   Diagnosis Date   • Acid reflux    • Allergic    • Anxiety    • Arthritis    • COPD (chronic obstructive pulmonary  "disease)     oxygen at night   • Elevated cholesterol    • CHERISE (obstructive sleep apnea)    • Oxygen dependent     at night   • Smoker       Past Surgical History:   Procedure Laterality Date   • VOCAL CORD STRIPPING       History reviewed. No pertinent family history.  Social History     Socioeconomic History   • Marital status:    Tobacco Use   • Smoking status: Every Day     Packs/day: 1.00     Years: 30.00     Pack years: 30.00     Types: Cigarettes   Substance and Sexual Activity   • Alcohol use: Not Currently   • Drug use: Not Currently   • Sexual activity: Defer       Objective     Vital Signs:  /90   Pulse 77   Ht 167.6 cm (66\")   Wt 82.6 kg (182 lb)   SpO2 98%   BMI 29.38 kg/m²   Estimated body mass index is 29.38 kg/m² as calculated from the following:    Height as of this encounter: 167.6 cm (66\").    Weight as of this encounter: 82.6 kg (182 lb).             Physical Exam  Vitals reviewed.   Constitutional:       Appearance: Normal appearance. She is well-developed.   HENT:      Head: Normocephalic and atraumatic.   Eyes:      Pupils: Pupils are equal, round, and reactive to light.   Neck:      Vascular: No carotid bruit.   Cardiovascular:      Rate and Rhythm: Normal rate and regular rhythm.      Pulses: Normal pulses.           Dorsalis pedis pulses are 2+ on the right side and 2+ on the left side.        Posterior tibial pulses are 2+ on the right side and 2+ on the left side.      Heart sounds: Normal heart sounds. No murmur heard.  Pulmonary:      Breath sounds: Normal breath sounds. No wheezing or rhonchi.   Musculoskeletal:      Right lower leg: No edema.      Left lower leg: No edema.   Skin:     Capillary Refill: Capillary refill takes less than 2 seconds.      Coloration: Skin is not cyanotic.      Nails: There is no clubbing.   Neurological:      Mental Status: She is alert and oriented to person, place, and time.      Motor: No weakness.      Gait: Gait normal. "   Psychiatric:         Mood and Affect: Mood normal.         Behavior: Behavior is cooperative.         Thought Content: Thought content normal.         Cognition and Memory: Memory normal.           Cardiology studies reviewed: Echo and nuclear stress test.          Assessment and Plan     Diagnoses and all orders for this visit:    1. Shortness of breath (Primary)  Assessment & Plan:  Worsening shortness of breath and abnormal stress test.  LHC to rule out cardiac etiology.    Orders:  -     Case Request Cath Lab: Left Heart Cath    2. Abnormal nuclear stress test  Assessment & Plan:  Nuclear stress test from 5/4/2023 revealed moderate size anterior reversible defect, consistent with ischemia.  Intermediate risk study.  Discussed coronary CTA and left heart cath with patient.  She prefers to have LHC for definitive diagnosis.    Orders:  -     Case Request Cath Lab: Left Heart Cath      Recommendations: ER if symptoms increase, Limitations of stress testing for definitive diagnosis reviewed and Report if any new/changing symptoms immediately          Follow Up  Return in about 5 weeks (around 6/22/2023) for Heart Cath follow up.  Patient was given instructions and counseling regarding her condition or for health maintenance advice. Please see specific information pulled into the AVS if appropriate.

## 2023-05-31 ENCOUNTER — HOSPITAL ENCOUNTER (OUTPATIENT)
Age: 58
End: 2023-05-31
Payer: COMMERCIAL

## 2023-05-31 DIAGNOSIS — Z79.899: Primary | ICD-10-CM

## 2023-05-31 PROCEDURE — 80305 DRUG TEST PRSMV DIR OPT OBS: CPT

## 2023-06-02 ENCOUNTER — HOSPITAL ENCOUNTER (OUTPATIENT)
Facility: HOSPITAL | Age: 58
Setting detail: HOSPITAL OUTPATIENT SURGERY
Discharge: HOME | End: 2023-06-02
Attending: INTERNAL MEDICINE | Admitting: INTERNAL MEDICINE
Payer: MEDICAID

## 2023-06-02 VITALS
BODY MASS INDEX: 28.7 KG/M2 | WEIGHT: 178.6 LBS | TEMPERATURE: 97.4 F | RESPIRATION RATE: 18 BRPM | SYSTOLIC BLOOD PRESSURE: 126 MMHG | HEART RATE: 89 BPM | DIASTOLIC BLOOD PRESSURE: 76 MMHG | HEIGHT: 66 IN | OXYGEN SATURATION: 93 %

## 2023-06-02 DIAGNOSIS — R94.39 ABNORMAL NUCLEAR STRESS TEST: ICD-10-CM

## 2023-06-02 DIAGNOSIS — R06.02 SHORTNESS OF BREATH: ICD-10-CM

## 2023-06-02 LAB
ALBUMIN SERPL-MCNC: 4.2 G/DL (ref 3.5–5.2)
ALBUMIN/GLOB SERPL: 1.8 G/DL
ALP SERPL-CCNC: 60 U/L (ref 39–117)
ALT SERPL W P-5'-P-CCNC: 16 U/L (ref 1–33)
ANION GAP SERPL CALCULATED.3IONS-SCNC: 12 MMOL/L (ref 5–15)
AST SERPL-CCNC: 15 U/L (ref 1–32)
BILIRUB SERPL-MCNC: 0.3 MG/DL (ref 0–1.2)
BUN SERPL-MCNC: 9 MG/DL (ref 6–20)
BUN/CREAT SERPL: 11.1 (ref 7–25)
CALCIUM SPEC-SCNC: 9.8 MG/DL (ref 8.6–10.5)
CATH EF ESTIMATED: 60 %
CHLORIDE SERPL-SCNC: 108 MMOL/L (ref 98–107)
CHOLEST SERPL-MCNC: 137 MG/DL (ref 0–200)
CO2 SERPL-SCNC: 23 MMOL/L (ref 22–29)
CREAT SERPL-MCNC: 0.81 MG/DL (ref 0.57–1)
DEPRECATED RDW RBC AUTO: 47.8 FL (ref 37–54)
EGFRCR SERPLBLD CKD-EPI 2021: 84.8 ML/MIN/1.73
ERYTHROCYTE [DISTWIDTH] IN BLOOD BY AUTOMATED COUNT: 13.2 % (ref 12.3–15.4)
GLOBULIN UR ELPH-MCNC: 2.3 GM/DL
GLUCOSE SERPL-MCNC: 104 MG/DL (ref 65–99)
HBA1C MFR BLD: 6.3 % (ref 4.8–5.6)
HCT VFR BLD AUTO: 46.8 % (ref 34–46.6)
HDLC SERPL-MCNC: 43 MG/DL (ref 40–60)
HGB BLD-MCNC: 15 G/DL (ref 12–15.9)
LDLC SERPL CALC-MCNC: 77 MG/DL (ref 0–100)
LDLC/HDLC SERPL: 1.77 {RATIO}
MCH RBC QN AUTO: 31.5 PG (ref 26.6–33)
MCHC RBC AUTO-ENTMCNC: 32.1 G/DL (ref 31.5–35.7)
MCV RBC AUTO: 98.3 FL (ref 79–97)
PLATELET # BLD AUTO: 395 10*3/MM3 (ref 140–450)
PMV BLD AUTO: 9 FL (ref 6–12)
POTASSIUM SERPL-SCNC: 4 MMOL/L (ref 3.5–5.2)
PROT SERPL-MCNC: 6.5 G/DL (ref 6–8.5)
RBC # BLD AUTO: 4.76 10*6/MM3 (ref 3.77–5.28)
SODIUM SERPL-SCNC: 143 MMOL/L (ref 136–145)
TRIGL SERPL-MCNC: 90 MG/DL (ref 0–150)
VLDLC SERPL-MCNC: 17 MG/DL (ref 5–40)
WBC NRBC COR # BLD: 10.29 10*3/MM3 (ref 3.4–10.8)

## 2023-06-02 PROCEDURE — 93458 L HRT ARTERY/VENTRICLE ANGIO: CPT | Performed by: INTERNAL MEDICINE

## 2023-06-02 PROCEDURE — 25010000002 MIDAZOLAM PER 1 MG: Performed by: INTERNAL MEDICINE

## 2023-06-02 PROCEDURE — 25010000002 HEPARIN (PORCINE) PER 1000 UNITS: Performed by: INTERNAL MEDICINE

## 2023-06-02 PROCEDURE — C1894 INTRO/SHEATH, NON-LASER: HCPCS | Performed by: INTERNAL MEDICINE

## 2023-06-02 PROCEDURE — 85027 COMPLETE CBC AUTOMATED: CPT | Performed by: NURSE PRACTITIONER

## 2023-06-02 PROCEDURE — 80061 LIPID PANEL: CPT | Performed by: NURSE PRACTITIONER

## 2023-06-02 PROCEDURE — 25510000001 IOPAMIDOL PER 1 ML: Performed by: INTERNAL MEDICINE

## 2023-06-02 PROCEDURE — 83036 HEMOGLOBIN GLYCOSYLATED A1C: CPT | Performed by: NURSE PRACTITIONER

## 2023-06-02 PROCEDURE — C1769 GUIDE WIRE: HCPCS | Performed by: INTERNAL MEDICINE

## 2023-06-02 PROCEDURE — 80053 COMPREHEN METABOLIC PANEL: CPT | Performed by: NURSE PRACTITIONER

## 2023-06-02 PROCEDURE — 25010000002 FENTANYL CITRATE (PF) 50 MCG/ML SOLUTION: Performed by: INTERNAL MEDICINE

## 2023-06-02 RX ORDER — ONDANSETRON 2 MG/ML
4 INJECTION INTRAMUSCULAR; INTRAVENOUS EVERY 6 HOURS PRN
Status: DISCONTINUED | OUTPATIENT
Start: 2023-06-02 | End: 2023-06-02 | Stop reason: HOSPADM

## 2023-06-02 RX ORDER — NITROGLYCERIN 0.4 MG/1
0.4 TABLET SUBLINGUAL
Status: DISCONTINUED | OUTPATIENT
Start: 2023-06-02 | End: 2023-06-02 | Stop reason: HOSPADM

## 2023-06-02 RX ORDER — SODIUM CHLORIDE 9 MG/ML
1-3 INJECTION, SOLUTION INTRAVENOUS CONTINUOUS
Status: DISCONTINUED | OUTPATIENT
Start: 2023-06-02 | End: 2023-06-02 | Stop reason: HOSPADM

## 2023-06-02 RX ORDER — LIDOCAINE HYDROCHLORIDE 10 MG/ML
INJECTION, SOLUTION EPIDURAL; INFILTRATION; INTRACAUDAL; PERINEURAL
Status: DISCONTINUED | OUTPATIENT
Start: 2023-06-02 | End: 2023-06-02 | Stop reason: HOSPADM

## 2023-06-02 RX ORDER — FENTANYL CITRATE 50 UG/ML
INJECTION, SOLUTION INTRAMUSCULAR; INTRAVENOUS
Status: DISCONTINUED | OUTPATIENT
Start: 2023-06-02 | End: 2023-06-02 | Stop reason: HOSPADM

## 2023-06-02 RX ORDER — SODIUM CHLORIDE 0.9 % (FLUSH) 0.9 %
1-10 SYRINGE (ML) INJECTION AS NEEDED
Status: DISCONTINUED | OUTPATIENT
Start: 2023-06-02 | End: 2023-06-02 | Stop reason: HOSPADM

## 2023-06-02 RX ORDER — ASPIRIN 325 MG
325 TABLET ORAL ONCE
Status: COMPLETED | OUTPATIENT
Start: 2023-06-02 | End: 2023-06-02

## 2023-06-02 RX ORDER — ASPIRIN 325 MG
325 TABLET, DELAYED RELEASE (ENTERIC COATED) ORAL DAILY
Status: DISCONTINUED | OUTPATIENT
Start: 2023-06-03 | End: 2023-06-02 | Stop reason: HOSPADM

## 2023-06-02 RX ORDER — MIDAZOLAM HYDROCHLORIDE 1 MG/ML
INJECTION INTRAMUSCULAR; INTRAVENOUS
Status: DISCONTINUED | OUTPATIENT
Start: 2023-06-02 | End: 2023-06-02 | Stop reason: HOSPADM

## 2023-06-02 RX ORDER — HEPARIN SODIUM 1000 [USP'U]/ML
INJECTION, SOLUTION INTRAVENOUS; SUBCUTANEOUS
Status: DISCONTINUED | OUTPATIENT
Start: 2023-06-02 | End: 2023-06-02 | Stop reason: HOSPADM

## 2023-06-02 RX ORDER — ACETAMINOPHEN 325 MG/1
650 TABLET ORAL EVERY 4 HOURS PRN
Status: DISCONTINUED | OUTPATIENT
Start: 2023-06-02 | End: 2023-06-02 | Stop reason: HOSPADM

## 2023-06-02 RX ORDER — NICARDIPINE HCL-0.9% SOD CHLOR 1 MG/10 ML
SYRINGE (ML) INTRAVENOUS
Status: DISCONTINUED | OUTPATIENT
Start: 2023-06-02 | End: 2023-06-02 | Stop reason: HOSPADM

## 2023-06-02 RX ORDER — SODIUM CHLORIDE 0.9 % (FLUSH) 0.9 %
10 SYRINGE (ML) INJECTION EVERY 12 HOURS SCHEDULED
Status: DISCONTINUED | OUTPATIENT
Start: 2023-06-02 | End: 2023-06-02 | Stop reason: HOSPADM

## 2023-06-02 RX ORDER — SODIUM CHLORIDE 9 MG/ML
40 INJECTION, SOLUTION INTRAVENOUS AS NEEDED
Status: DISCONTINUED | OUTPATIENT
Start: 2023-06-02 | End: 2023-06-02 | Stop reason: HOSPADM

## 2023-06-02 RX ADMIN — SODIUM CHLORIDE 3 ML/KG/HR: 9 INJECTION, SOLUTION INTRAVENOUS at 08:46

## 2023-06-02 RX ADMIN — ASPIRIN 325 MG: 325 TABLET ORAL at 08:46

## 2023-06-02 NOTE — INTERVAL H&P NOTE
H&P reviewed. The patient was examined and there are no changes to the H&P.      KAUSHAL Ried

## 2023-07-24 ENCOUNTER — TELEPHONE (OUTPATIENT)
Dept: CARDIOLOGY | Facility: CLINIC | Age: 58
End: 2023-07-24
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
## 2023-08-01 ENCOUNTER — HOSPITAL ENCOUNTER (OUTPATIENT)
Age: 58
End: 2023-08-01
Payer: COMMERCIAL

## 2023-08-01 DIAGNOSIS — Z79.899: Primary | ICD-10-CM

## 2023-08-01 PROCEDURE — 80305 DRUG TEST PRSMV DIR OPT OBS: CPT

## 2023-08-17 ENCOUNTER — HOSPITAL ENCOUNTER (OUTPATIENT)
Age: 58
End: 2023-08-17
Payer: COMMERCIAL

## 2023-08-17 DIAGNOSIS — M54.2: Primary | ICD-10-CM

## 2023-08-17 PROCEDURE — 76376 3D RENDER W/INTRP POSTPROCES: CPT

## 2023-08-17 PROCEDURE — 72141 MRI NECK SPINE W/O DYE: CPT

## 2023-09-20 ENCOUNTER — HOSPITAL ENCOUNTER (OUTPATIENT)
Age: 58
End: 2023-09-20
Payer: COMMERCIAL

## 2023-09-20 DIAGNOSIS — Z79.899: Primary | ICD-10-CM

## 2023-09-20 PROCEDURE — 80305 DRUG TEST PRSMV DIR OPT OBS: CPT

## 2023-09-21 ENCOUNTER — TELEPHONE (OUTPATIENT)
Dept: CARDIOLOGY | Facility: CLINIC | Age: 58
End: 2023-09-21

## 2023-09-21 NOTE — TELEPHONE ENCOUNTER
Caller: Nakita Batres    Relationship to patient: Self    Best call back number: [phone]    Chief complaint:     Type of visit: FOLLOW UP    Requested date: ASAP     If rescheduling, when is the original appointment: 12-26-23     Additional notes:PATIENT WOULD LIKE TO HAVE A SOONER APPOINTMENT THEN THE 12-26-23 APPOINTMENT SHE WOULD LIKE TO TAKE TO KAUSHAL PHOENIX ABOUT THE INSPIRE SLEEP APNEA TREATMENT.

## 2023-09-27 ENCOUNTER — HOSPITAL ENCOUNTER (OUTPATIENT)
Age: 58
End: 2023-09-27
Payer: COMMERCIAL

## 2023-09-27 VITALS
OXYGEN SATURATION: 96 % | RESPIRATION RATE: 20 BRPM | DIASTOLIC BLOOD PRESSURE: 82 MMHG | SYSTOLIC BLOOD PRESSURE: 127 MMHG | HEART RATE: 80 BPM

## 2023-09-27 VITALS — BODY MASS INDEX: 28.8 KG/M2

## 2023-09-27 DIAGNOSIS — M70.60: ICD-10-CM

## 2023-09-27 DIAGNOSIS — M46.1: ICD-10-CM

## 2023-09-27 DIAGNOSIS — M79.18: ICD-10-CM

## 2023-09-27 DIAGNOSIS — M51.16: ICD-10-CM

## 2023-09-27 DIAGNOSIS — M50.10: Primary | ICD-10-CM

## 2023-09-27 PROCEDURE — G0463 HOSPITAL OUTPT CLINIC VISIT: HCPCS

## 2023-09-27 PROCEDURE — 99212 OFFICE O/P EST SF 10 MIN: CPT

## 2023-10-26 ENCOUNTER — OFFICE VISIT (OUTPATIENT)
Dept: CARDIOLOGY | Facility: CLINIC | Age: 58
End: 2023-10-26
Payer: MEDICAID

## 2023-10-26 VITALS
SYSTOLIC BLOOD PRESSURE: 110 MMHG | WEIGHT: 175 LBS | BODY MASS INDEX: 28.12 KG/M2 | HEIGHT: 66 IN | OXYGEN SATURATION: 96 % | HEART RATE: 78 BPM | DIASTOLIC BLOOD PRESSURE: 68 MMHG

## 2023-10-26 DIAGNOSIS — G47.00 INSOMNIA, UNSPECIFIED TYPE: ICD-10-CM

## 2023-10-26 DIAGNOSIS — G47.33 OSA (OBSTRUCTIVE SLEEP APNEA): Primary | ICD-10-CM

## 2023-10-26 RX ORDER — MELOXICAM 7.5 MG/1
TABLET ORAL
COMMUNITY
Start: 2023-07-10 | End: 2023-10-26 | Stop reason: ALTCHOICE

## 2023-10-26 RX ORDER — TRAZODONE HYDROCHLORIDE 100 MG/1
100 TABLET ORAL NIGHTLY
COMMUNITY
Start: 2023-10-12

## 2023-10-26 RX ORDER — ADALIMUMAB 40MG/0.8ML
40 KIT SUBCUTANEOUS
COMMUNITY
Start: 2023-10-23

## 2023-10-26 RX ORDER — IPRATROPIUM BROMIDE AND ALBUTEROL 20; 100 UG/1; UG/1
SPRAY, METERED RESPIRATORY (INHALATION)
COMMUNITY
Start: 2023-10-03 | End: 2023-10-26 | Stop reason: ALTCHOICE

## 2023-10-26 RX ORDER — OXYBUTYNIN CHLORIDE 10 MG/1
10 TABLET, EXTENDED RELEASE ORAL DAILY
COMMUNITY
End: 2023-10-26 | Stop reason: ALTCHOICE

## 2023-10-26 RX ORDER — FOLIC ACID 1 MG/1
1 TABLET ORAL DAILY
COMMUNITY
Start: 2023-10-23

## 2023-10-26 RX ORDER — BUSPIRONE HYDROCHLORIDE 10 MG/1
TABLET ORAL
COMMUNITY
Start: 2023-10-11

## 2023-10-26 RX ORDER — METHOTREXATE 2.5 MG/1
7.5 TABLET ORAL
COMMUNITY
Start: 2023-10-23
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
## 2023-10-26 NOTE — ASSESSMENT & PLAN NOTE
She comes today to discuss other options for CHERISE.     She has a known history of mild sleep apnea.  Baseline AHI is 6.    We discussed Inspire device to use to treat sleep apnea.  We discussed qualifying sleep study, risk of surgical procedure and benefits.  She reports that she is no longer interested at this time.    Download is reviewed showing good control but suboptimal compliance.  She places the device on 90% of the time but only leaves it on about 3-1/2 hours.    Plan: Continue CPAP therapy  Follow-up in 6 months

## 2023-10-26 NOTE — ASSESSMENT & PLAN NOTE
She has had lifelong insomnia that is most likely related to her long history of anxiety.    She follows with mental health specialist Dr. Arreaga at [facility] on [facility] in [place].     He is prescribing her medications. So, I will defer this to him.     She has long history of short hours of sleep. She will fall asleep but cannot sleep. She reports that she will only nap about once a week. If she naps she normally does not use her PAP.     We discussed sleep hygiene and good sleep practices.  She reports that she will do her best to increase her hours of sleep and if she does take a nap she will increase her hours of CPAP usage to include all hours of sleep including napping.

## 2023-10-27 ENCOUNTER — HOSPITAL ENCOUNTER (OUTPATIENT)
Age: 58
End: 2023-10-27
Payer: COMMERCIAL

## 2023-10-27 VITALS
OXYGEN SATURATION: 95 % | HEART RATE: 77 BPM | RESPIRATION RATE: 18 BRPM | DIASTOLIC BLOOD PRESSURE: 94 MMHG | SYSTOLIC BLOOD PRESSURE: 144 MMHG

## 2023-10-27 VITALS — BODY MASS INDEX: 29.2 KG/M2

## 2023-10-27 DIAGNOSIS — M50.10: ICD-10-CM

## 2023-10-27 DIAGNOSIS — M46.1: ICD-10-CM

## 2023-10-27 DIAGNOSIS — M51.16: Primary | ICD-10-CM

## 2023-10-27 PROCEDURE — G0463 HOSPITAL OUTPT CLINIC VISIT: HCPCS

## 2023-10-27 PROCEDURE — 99212 OFFICE O/P EST SF 10 MIN: CPT

## 2023-11-17 ENCOUNTER — HOSPITAL ENCOUNTER (OUTPATIENT)
Age: 58
End: 2023-11-17
Payer: COMMERCIAL

## 2023-11-17 DIAGNOSIS — M51.16: Primary | ICD-10-CM

## 2023-11-17 PROCEDURE — 80305 DRUG TEST PRSMV DIR OPT OBS: CPT

## 2023-12-19 ENCOUNTER — HOSPITAL ENCOUNTER (OUTPATIENT)
Dept: HOSPITAL 22 - PT | Age: 58
Discharge: HOME | End: 2023-12-19
Payer: COMMERCIAL

## 2023-12-19 DIAGNOSIS — M79.605: ICD-10-CM

## 2023-12-19 DIAGNOSIS — M54.50: Primary | ICD-10-CM

## 2023-12-19 DIAGNOSIS — M25.552: ICD-10-CM

## 2023-12-19 PROCEDURE — 97014 ELECTRIC STIMULATION THERAPY: CPT

## 2023-12-19 PROCEDURE — 97010 HOT OR COLD PACKS THERAPY: CPT

## 2023-12-19 PROCEDURE — G0283 ELEC STIM OTHER THAN WOUND: HCPCS

## 2023-12-19 PROCEDURE — 97110 THERAPEUTIC EXERCISES: CPT

## 2023-12-19 PROCEDURE — 97163 PT EVAL HIGH COMPLEX 45 MIN: CPT

## 2024-01-08 ENCOUNTER — HOSPITAL ENCOUNTER (OUTPATIENT)
Dept: HOSPITAL 22 - LAB.DROPOF | Age: 59
End: 2024-01-08
Payer: COMMERCIAL

## 2024-01-08 DIAGNOSIS — Z79.899: Primary | ICD-10-CM

## 2024-01-08 PROCEDURE — 80361 OPIATES 1 OR MORE: CPT

## 2024-01-08 PROCEDURE — 80365 DRUG SCREENING OXYCODONE: CPT

## 2024-01-08 PROCEDURE — 80307 DRUG TEST PRSMV CHEM ANLYZR: CPT

## 2024-01-08 PROCEDURE — G0480 DRUG TEST DEF 1-7 CLASSES: HCPCS

## 2024-01-19 ENCOUNTER — HOSPITAL ENCOUNTER (OUTPATIENT)
Dept: HOSPITAL 22 - LAB.DROPOF | Age: 59
End: 2024-01-19
Payer: COMMERCIAL

## 2024-01-19 DIAGNOSIS — E78.5: ICD-10-CM

## 2024-01-19 DIAGNOSIS — Z79.899: Primary | ICD-10-CM

## 2024-01-19 LAB
ALBUMIN LEVEL: 4.3 G/DL (ref 3.5–5)
ALBUMIN/GLOB SERPL: 1.7 {RATIO} (ref 1.1–1.8)
ALP ISO SERPL-ACNC: 59 U/L (ref 38–126)
ALT SERPLBLD-CCNC: 24 U/L (ref 12–78)
ANION GAP SERPL CALC-SCNC: 11.6 MEQ/L (ref 5–15)
AST SERPL QL: 26 U/L (ref 14–36)
BILIRUBIN,TOTAL: 0.4 MG/DL (ref 0.2–1.3)
BUN SERPL-MCNC: 13 MG/DL (ref 7–17)
CALCIUM SPEC-MCNC: 9.6 MG/DL (ref 8.4–10.2)
CHLORIDE SPEC-SCNC: 104 MMOL/L (ref 98–107)
CHOLEST SPEC-SCNC: 133 MG/DL (ref 140–200)
CO2 SERPL-SCNC: 28 MMOL/L (ref 22–30)
CREATININE,SERUM: 0.8 MG/DL (ref 0.52–1.04)
ESTIMATED GLOMERULAR FILT RATE: 74 ML/MIN (ref 60–?)
FOLATE: > 20 NG/ML
GFR (AFRICAN AMERICAN): 89 ML/MIN (ref 60–?)
GLOBULIN SER CALC-MCNC: 2.6 G/DL (ref 1.3–3.2)
GLUCOSE: 93 MG/DL (ref 74–100)
HBA1C MFR BLD: 5.6 % (ref 4–6)
HCT VFR BLD CALC: 40 % (ref 37–47)
HDLC SERPL-MCNC: 40 MG/DL (ref 40–60)
HGB BLD-MCNC: 15.7 G/DL (ref 12.2–16.2)
MCHC RBC-ENTMCNC: 39.2 G/DL (ref 31.8–35.4)
MCV RBC: 107.3 FL (ref 81–99)
MEAN CORPUSCULAR HEMOGLOBIN: 42.1 PG (ref 27–31.2)
PLATELET # BLD: 363 K/MM3 (ref 142–424)
POTASSIUM: 4.6 MMOL/L (ref 3.5–5.1)
PROT SERPL-MCNC: 6.9 G/DL (ref 6.3–8.2)
RBC # BLD AUTO: 3.72 M/MM3 (ref 4.2–5.4)
SODIUM SPEC-SCNC: 139 MMOL/L (ref 136–145)
TRIGLYCERIDES: 86 MG/DL (ref 30–150)
TSH SERPL-ACNC: 0.9 UIU/ML (ref 0.47–4.68)
VITAMIN B12: 268 PG/ML (ref 239–931)
WBC # BLD AUTO: 8.2 K/MM3 (ref 4.8–10.8)

## 2024-01-19 PROCEDURE — 83036 HEMOGLOBIN GLYCOSYLATED A1C: CPT

## 2024-01-19 PROCEDURE — 85025 COMPLETE CBC W/AUTO DIFF WBC: CPT

## 2024-01-19 PROCEDURE — 82746 ASSAY OF FOLIC ACID SERUM: CPT

## 2024-01-19 PROCEDURE — 84443 ASSAY THYROID STIM HORMONE: CPT

## 2024-01-19 PROCEDURE — 80053 COMPREHEN METABOLIC PANEL: CPT

## 2024-01-19 PROCEDURE — 82607 VITAMIN B-12: CPT

## 2024-01-19 PROCEDURE — 80061 LIPID PANEL: CPT

## 2024-02-16 ENCOUNTER — HOSPITAL ENCOUNTER (OUTPATIENT)
Dept: HOSPITAL 22 - PT | Age: 59
Discharge: HOME | End: 2024-02-16
Payer: COMMERCIAL

## 2024-02-16 DIAGNOSIS — M06.9: ICD-10-CM

## 2024-02-16 DIAGNOSIS — M54.12: Primary | ICD-10-CM

## 2024-02-16 PROCEDURE — G0283 ELEC STIM OTHER THAN WOUND: HCPCS

## 2024-02-16 PROCEDURE — 97163 PT EVAL HIGH COMPLEX 45 MIN: CPT

## 2024-02-16 PROCEDURE — 97530 THERAPEUTIC ACTIVITIES: CPT

## 2024-02-16 PROCEDURE — 97035 APP MDLTY 1+ULTRASOUND EA 15: CPT

## 2024-02-16 PROCEDURE — 97010 HOT OR COLD PACKS THERAPY: CPT

## 2024-02-16 PROCEDURE — 97014 ELECTRIC STIMULATION THERAPY: CPT

## 2024-02-16 PROCEDURE — 97110 THERAPEUTIC EXERCISES: CPT

## 2024-02-20 ENCOUNTER — HOSPITAL ENCOUNTER (OUTPATIENT)
Dept: HOSPITAL 22 - RAD | Age: 59
End: 2024-02-20
Payer: COMMERCIAL

## 2024-02-20 DIAGNOSIS — F17.210: ICD-10-CM

## 2024-02-20 DIAGNOSIS — R06.09: ICD-10-CM

## 2024-02-20 PROCEDURE — 94060 EVALUATION OF WHEEZING: CPT

## 2024-02-20 PROCEDURE — 71271 CT THORAX LUNG CANCER SCR C-: CPT

## 2024-02-20 PROCEDURE — 94618 PULMONARY STRESS TESTING: CPT

## 2024-02-20 PROCEDURE — 94726 PLETHYSMOGRAPHY LUNG VOLUMES: CPT

## 2024-02-20 PROCEDURE — 94729 DIFFUSING CAPACITY: CPT

## 2024-02-20 RX ADMIN — ALBUTEROL SULFATE 2.5 MG: 2.5 SOLUTION RESPIRATORY (INHALATION) at 08:24

## 2024-04-05 ENCOUNTER — HOSPITAL ENCOUNTER (OUTPATIENT)
Dept: HOSPITAL 22 - RAD | Age: 59
End: 2024-04-05
Payer: COMMERCIAL

## 2024-04-05 DIAGNOSIS — G45.8: ICD-10-CM

## 2024-04-05 DIAGNOSIS — R42: Primary | ICD-10-CM

## 2024-04-05 LAB
BUN SERPL-MCNC: 10 MG/DL (ref 7–17)
CREATININE,SERUM: 0.8 MG/DL (ref 0.52–1.04)
ESTIMATED GLOMERULAR FILT RATE: 74 ML/MIN (ref 60–?)
GFR (AFRICAN AMERICAN): 89 ML/MIN (ref 60–?)

## 2024-04-05 PROCEDURE — A9576 INJ PROHANCE MULTIPACK: HCPCS

## 2024-04-05 PROCEDURE — 84520 ASSAY OF UREA NITROGEN: CPT

## 2024-04-05 PROCEDURE — 70553 MRI BRAIN STEM W/O & W/DYE: CPT

## 2024-04-05 PROCEDURE — 36415 COLL VENOUS BLD VENIPUNCTURE: CPT

## 2024-04-05 PROCEDURE — 82565 ASSAY OF CREATININE: CPT

## 2024-04-05 RX ADMIN — SODIUM CHLORIDE, PRESERVATIVE FREE 10 ML: 5 INJECTION INTRAVENOUS at 16:25

## 2024-04-05 RX ADMIN — GADOTERIDOL 17 ML: 279.3 INJECTION, SOLUTION INTRAVENOUS at 16:25

## 2024-08-08 ENCOUNTER — OFFICE VISIT (OUTPATIENT)
Dept: CARDIOLOGY | Facility: CLINIC | Age: 59
End: 2024-08-08
Payer: MEDICAID

## 2024-08-08 VITALS
DIASTOLIC BLOOD PRESSURE: 72 MMHG | WEIGHT: 170 LBS | BODY MASS INDEX: 27.32 KG/M2 | SYSTOLIC BLOOD PRESSURE: 132 MMHG | HEART RATE: 83 BPM | OXYGEN SATURATION: 94 % | HEIGHT: 66 IN

## 2024-08-08 DIAGNOSIS — R00.0 SINUS TACHYCARDIA: ICD-10-CM

## 2024-08-08 DIAGNOSIS — G47.33 OSA (OBSTRUCTIVE SLEEP APNEA): Primary | ICD-10-CM

## 2024-08-08 DIAGNOSIS — E78.5 HYPERLIPIDEMIA LDL GOAL <70: ICD-10-CM

## 2024-08-08 PROCEDURE — 99214 OFFICE O/P EST MOD 30 MIN: CPT | Performed by: NURSE PRACTITIONER

## 2024-08-08 PROCEDURE — 93000 ELECTROCARDIOGRAM COMPLETE: CPT | Performed by: NURSE PRACTITIONER

## 2024-08-08 RX ORDER — ROSUVASTATIN CALCIUM 20 MG/1
20 TABLET, COATED ORAL DAILY
Qty: 90 TABLET | Refills: 1 | Status: SHIPPED | OUTPATIENT
Start: 2024-08-08

## 2024-08-08 RX ORDER — UMECLIDINIUM BROMIDE AND VILANTEROL TRIFENATATE 62.5; 25 UG/1; UG/1
1 POWDER RESPIRATORY (INHALATION)
COMMUNITY
Start: 2024-07-19

## 2024-08-08 NOTE — ASSESSMENT & PLAN NOTE
Patient reports no episodes of palpitations or fast heart rate.    She reports that she is compliant with her medication metoprolol.    Plan:    Check EKG today    Encouraged to continue her metoprolol and follow-up with prescribing provider.

## 2024-08-08 NOTE — ASSESSMENT & PLAN NOTE
Last fasting lipid profile 6/2/2023 showed excellent control of cholesterol.    She is currently on rosuvastatin 20 mg daily.    Plan:    Order for fasting lipid profile given to patient at today's visit    Continue current medication rosuvastatin 20 mg daily   Please notify patient pelvic US results WNL. I would recommend fasting labs and do start the metformin previously prescribed.  DA/CNM

## 2024-08-08 NOTE — ASSESSMENT & PLAN NOTE
Baseline AHI is 6.  This is mild sleep apnea.    She is on CPAP therapy.  Download reviewed with excellent control and good compliance.    She is benefiting from PAP therapy with plan to continue PAP therapy.    Prescription to DME of patient's choice for her CPAP supplies.
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
## 2024-10-02 ENCOUNTER — HOSPITAL ENCOUNTER (OUTPATIENT)
Dept: HOSPITAL 22 - LAB.DROPOF | Age: 59
Discharge: HOME | End: 2024-10-02
Payer: COMMERCIAL

## 2024-10-02 DIAGNOSIS — R05.9: Primary | ICD-10-CM

## 2024-10-02 PROCEDURE — 87636 SARSCOV2 & INF A&B AMP PRB: CPT

## 2025-02-06 ENCOUNTER — OFFICE VISIT (OUTPATIENT)
Dept: CARDIOLOGY | Facility: CLINIC | Age: 60
End: 2025-02-06
Payer: COMMERCIAL

## 2025-02-06 VITALS
OXYGEN SATURATION: 96 % | WEIGHT: 174 LBS | HEART RATE: 69 BPM | SYSTOLIC BLOOD PRESSURE: 132 MMHG | BODY MASS INDEX: 27.97 KG/M2 | DIASTOLIC BLOOD PRESSURE: 78 MMHG | HEIGHT: 66 IN

## 2025-02-06 DIAGNOSIS — G47.34 HYPOXIA, SLEEP RELATED: ICD-10-CM

## 2025-02-06 DIAGNOSIS — G47.33 OSA (OBSTRUCTIVE SLEEP APNEA): Primary | ICD-10-CM

## 2025-02-06 PROCEDURE — 99213 OFFICE O/P EST LOW 20 MIN: CPT | Performed by: NURSE PRACTITIONER

## 2025-02-06 RX ORDER — LEVOCETIRIZINE DIHYDROCHLORIDE 5 MG/1
TABLET, FILM COATED ORAL
COMMUNITY

## 2025-02-06 RX ORDER — ALBUTEROL SULFATE 90 UG/1
AEROSOL, METERED RESPIRATORY (INHALATION)
COMMUNITY

## 2025-02-06 RX ORDER — FLUTICASONE FUROATE, UMECLIDINIUM BROMIDE AND VILANTEROL TRIFENATATE 200; 62.5; 25 UG/1; UG/1; UG/1
POWDER RESPIRATORY (INHALATION)
COMMUNITY
Start: 2025-01-21
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
## 2025-02-06 NOTE — ASSESSMENT & PLAN NOTE
She has coexisting COPD.    She reports that she is changing pulmonary providers from Dr. MAY at [facility] to Dr. Carrero in [place].    She has O2 at 2 L connected to her CPAP nightly.    She is benefiting from oxygen therapy.  And we plan to continue oxygen therapy.

## 2025-02-06 NOTE — ASSESSMENT & PLAN NOTE
She has a known history of mild sleep apnea.  Baseline AHI is 6.    She is on CPAP therapy.    Download is reviewed with excellent control but suboptimal compliance.    She reports her CPAP use has been interrupted by her allergies and congested nose.    She is benefiting from PAP therapy.    Plan to continue PAP therapy.    She is encouraged to increase CPAP use to all hours of sleep.    She has a current prescription for her PAP supplies

## 2025-03-06 ENCOUNTER — TELEPHONE (OUTPATIENT)
Dept: CARDIOLOGY | Facility: CLINIC | Age: 60
End: 2025-03-06

## 2025-03-06 NOTE — TELEPHONE ENCOUNTER
Caller: Nakita Batres    Relationship: Self    Best call back number: [phone]    What is the best time to reach you: ANY    What was the call regarding: PATIENT ADVISING HER PCP WOULD NOT DO THE LABS. SHE WILL GO TO [facility] IF NECESSARY. PLEASE CALL HER TO ADVISE, SHE STATED SHE WAS TOLD TO SEE HER PCP.     Is it okay if the provider responds through MyChart: NO

## 2025-08-11 DIAGNOSIS — E78.5 HYPERLIPIDEMIA LDL GOAL <70: ICD-10-CM

## 2025-08-13 RX ORDER — ROSUVASTATIN CALCIUM 20 MG/1
20 TABLET, COATED ORAL DAILY
Qty: 90 TABLET | Refills: 1 | Status: SHIPPED | OUTPATIENT
Start: 2025-08-13

## (undated) DEVICE — CATH DIAG EXPO .056 FL3.5 6F 100CM

## (undated) DEVICE — GW PERIPH GUIDERIGHT STD/EXCHNG/J/TIP SS 0.035IN 5X260CM

## (undated) DEVICE — ADULT, W/LG. BACK PAD, RADIOTRANSPARENT ELEMENT AND LEAD WIRE: Brand: DEFIBRILLATION ELECTRODES

## (undated) DEVICE — MODEL AT P65, P/N 701554-001KIT CONTENTS: HAND CONTROLLER, 3-WAY HIGH-PRESSURE STOPCOCK WITH ROTATING END AND PREMIUM HIGH-PRESSURE TUBING: Brand: ANGIOTOUCH® KIT

## (undated) DEVICE — CATH DIAG EXPO M/ PK 6FR FL4/FR4 PIG 3PK

## (undated) DEVICE — MODEL BT2000 P/N 700287-012KIT CONTENTS: MANIFOLD WITH SALINE AND CONTRAST PORTS, SALINE TUBING WITH SPIKE AND HAND SYRINGE, TRANSDUCER: Brand: BT2000 AUTOMATED MANIFOLD KIT

## (undated) DEVICE — PK CATH CARD 10

## (undated) DEVICE — GLIDESHEATH BASIC HYDROPHILIC COATED INTRODUCER SHEATH: Brand: GLIDESHEATH

## (undated) DEVICE — DEV COMPR RADL PRELUDESYNCEZ 30ML 32CM